# Patient Record
Sex: FEMALE | Race: WHITE | NOT HISPANIC OR LATINO | Employment: FULL TIME | URBAN - NONMETROPOLITAN AREA
[De-identification: names, ages, dates, MRNs, and addresses within clinical notes are randomized per-mention and may not be internally consistent; named-entity substitution may affect disease eponyms.]

---

## 2018-08-14 ENCOUNTER — OFFICE VISIT (OUTPATIENT)
Dept: CARDIOLOGY | Facility: CLINIC | Age: 38
End: 2018-08-14

## 2018-08-14 VITALS
BODY MASS INDEX: 22.78 KG/M2 | SYSTOLIC BLOOD PRESSURE: 138 MMHG | WEIGHT: 113 LBS | OXYGEN SATURATION: 99 % | HEART RATE: 53 BPM | DIASTOLIC BLOOD PRESSURE: 80 MMHG | HEIGHT: 59 IN

## 2018-08-14 DIAGNOSIS — Z82.49 FAMILY HISTORY OF PREMATURE CAD: ICD-10-CM

## 2018-08-14 DIAGNOSIS — R00.1 BRADYCARDIA: ICD-10-CM

## 2018-08-14 DIAGNOSIS — E78.1 HYPERTRIGLYCERIDEMIA: ICD-10-CM

## 2018-08-14 DIAGNOSIS — R73.9 HYPERGLYCEMIA: ICD-10-CM

## 2018-08-14 DIAGNOSIS — R07.1 CHEST PAIN ON BREATHING: ICD-10-CM

## 2018-08-14 DIAGNOSIS — Z78.9 STATIN INTOLERANCE: ICD-10-CM

## 2018-08-14 DIAGNOSIS — E78.01 FAMILIAL HYPERCHOLESTEROLEMIA: Primary | ICD-10-CM

## 2018-08-14 DIAGNOSIS — I10 ESSENTIAL HYPERTENSION: ICD-10-CM

## 2018-08-14 PROBLEM — E78.5 HYPERLIPIDEMIA: Status: ACTIVE | Noted: 2018-08-14

## 2018-08-14 PROCEDURE — 93000 ELECTROCARDIOGRAM COMPLETE: CPT | Performed by: NURSE PRACTITIONER

## 2018-08-14 PROCEDURE — 99244 OFF/OP CNSLTJ NEW/EST MOD 40: CPT | Performed by: NURSE PRACTITIONER

## 2018-08-14 RX ORDER — ICOSAPENT ETHYL 1000 MG/1
2 CAPSULE ORAL 2 TIMES DAILY WITH MEALS
Qty: 120 CAPSULE | Refills: 11 | Status: SHIPPED | OUTPATIENT
Start: 2018-08-14 | End: 2018-11-19

## 2018-08-14 RX ORDER — ASPIRIN 81 MG/1
81 TABLET ORAL DAILY
COMMUNITY
Start: 2018-08-14 | End: 2023-03-09

## 2018-08-14 RX ORDER — OMEPRAZOLE 20 MG/1
20 CAPSULE, DELAYED RELEASE ORAL 2 TIMES DAILY
COMMUNITY

## 2018-08-14 NOTE — ASSESSMENT & PLAN NOTE
Usually controlled with medications. Discussed increased cardiovascular risks with elevated glucose, HTN, and lipids and importance of good control along with healthy lifestyle.

## 2018-08-14 NOTE — ASSESSMENT & PLAN NOTE
Will change Lovaza to Vascepa 2 g twice a day since preferred on her formulary. CMP and lipids 6 weeks after starting Zetia and Vascepa. May need PCSK9i or Juxtapid. 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 Encouraged continued healthy diet and encouraged routine aerobic exercise. Provided written information to reinforce counseling. Consider referral to nutritionist/dietician. Check TSH.

## 2018-08-14 NOTE — ASSESSMENT & PLAN NOTE
Most likely DM2 with fasting glucose 143 and A1C 6.5% or at least prediabetes. She reports glucose levels have improved with diet. May need to consider metformin. Will defer to PCP who is seeing her tomorrow.

## 2018-08-14 NOTE — ASSESSMENT & PLAN NOTE
Will consider stress testing if has exertional, nonpleuritic chest pain. Add low dose aspirin but cautioned about possibility of worsening peptic ulcer disease.

## 2018-08-14 NOTE — PATIENT INSTRUCTIONS
Exercising to Stay Healthy  Exercising regularly is important. It has many health benefits, such as:  · Improving your overall fitness, flexibility, and endurance.  · Increasing your bone density.  · Helping with weight control.  · Decreasing your body fat.  · Increasing your muscle strength.  · Reducing stress and tension.  · Improving your overall health.    In order to become healthy and stay healthy, it is recommended that you do moderate-intensity and vigorous-intensity exercise. You can tell that you are exercising at a moderate intensity if you have a higher heart rate and faster breathing, but you are still able to hold a conversation. You can tell that you are exercising at a vigorous intensity if you are breathing much harder and faster and cannot hold a conversation while exercising.  How often should I exercise?  Choose an activity that you enjoy and set realistic goals. Your health care provider can help you to make an activity plan that works for you. Exercise regularly as directed by your health care provider. This may include:  · Doing resistance training twice each week, such as:  ? Push-ups.  ? Sit-ups.  ? Lifting weights.  ? Using resistance bands.  · Doing a given intensity of exercise for a given amount of time. Choose from these options:  ? 150 minutes of moderate-intensity exercise every week.  ? 75 minutes of vigorous-intensity exercise every week.  ? A mix of moderate-intensity and vigorous-intensity exercise every week.    Children, pregnant women, people who are out of shape, people who are overweight, and older adults may need to consult a health care provider for individual recommendations. If you have any sort of medical condition, be sure to consult your health care provider before starting a new exercise program.  What are some exercise ideas?  Some moderate-intensity exercise ideas include:  · Walking at a rate of 1 mile in 15  minutes.  · Biking.  · Hiking.  · Golfing.  · Dancing.    Some vigorous-intensity exercise ideas include:  · Walking at a rate of at least 4.5 miles per hour.  · Jogging or running at a rate of 5 miles per hour.  · Biking at a rate of at least 10 miles per hour.  · Lap swimming.  · Roller-skating or in-line skating.  · Cross-country skiing.  · Vigorous competitive sports, such as football, basketball, and soccer.  · Jumping rope.  · Aerobic dancing.    What are some everyday activities that can help me to get exercise?  · Yard work, such as:  ? Pushing a .  ? Raking and bagging leaves.  · Washing and waxing your car.  · Pushing a stroller.  · Shoveling snow.  · Gardening.  · Washing windows or floors.  How can I be more active in my day-to-day activities?  · Use the stairs instead of the elevator.  · Take a walk during your lunch break.  · If you drive, park your car farther away from work or school.  · If you take public transportation, get off one stop early and walk the rest of the way.  · Make all of your phone calls while standing up and walking around.  · Get up, stretch, and walk around every 30 minutes throughout the day.  What guidelines should I follow while exercising?  · Do not exercise so much that you hurt yourself, feel dizzy, or get very short of breath.  · Consult your health care provider before starting a new exercise program.  · Wear comfortable clothes and shoes with good support.  · Drink plenty of water while you exercise to prevent dehydration or heat stroke. Body water is lost during exercise and must be replaced.  · Work out until you breathe faster and your heart beats faster.  This information is not intended to replace advice given to you by your health care provider. Make sure you discuss any questions you have with your health care provider.  Document Released: 01/20/2012 Document Revised: 05/25/2017 Document Reviewed: 05/21/2015  [employer] Interactive Patient Education © 2018  "[employer]  Food Choices to Lower Your Triglycerides  Triglycerides are a type of fat in your blood. High levels of triglycerides can increase the risk of heart disease and stroke. If your triglyceride levels are high, the foods you eat and your eating habits are very important. Choosing the right foods can help lower your triglycerides.  What general guidelines do I need to follow?  · Lose weight if you are overweight.  · Limit or avoid alcohol.  · Fill one half of your plate with vegetables and green salads.  · Limit fruit to two servings a day. Choose fruit instead of juice.  · Make one fourth of your plate whole grains. Look for the word \"whole\" as the first word in the ingredient list.  · Fill one fourth of your plate with lean protein foods.  · Enjoy fatty fish (such as salmon, mackerel, sardines, and tuna) three times a week.  · Choose healthy fats.  · Limit foods high in starch and sugar.  · Eat more home-cooked food and less restaurant, buffet, and fast food.  · Limit fried foods.  · Cook foods using methods other than frying.  · Limit saturated fats.  · Check ingredient lists to avoid foods with partially hydrogenated oils (trans fats) in them.  What foods can I eat?  Grains  Whole grains, such as whole wheat or whole grain breads, crackers, cereals, and pasta. Unsweetened oatmeal, bulgur, barley, quinoa, or brown rice. Corn or whole wheat flour tortillas.  Vegetables  Fresh or frozen vegetables (raw, steamed, roasted, or grilled). Green salads.  Fruits  All fresh, canned (in natural juice), or frozen fruits.  Meat and Other Protein Products  Ground beef (85% or leaner), grass-fed beef, or beef trimmed of fat. Skinless chicken or turkey. Ground chicken or turkey. Pork trimmed of fat. All fish and seafood. Eggs. Dried beans, peas, or lentils. Unsalted nuts or seeds. Unsalted canned or dry beans.  Dairy  Low-fat dairy products, such as skim or 1% milk, 2% or reduced-fat cheeses, low-fat ricotta or cottage " cheese, or plain low-fat yogurt.  Fats and Oils  Tub margarines without trans fats. Light or reduced-fat mayonnaise and salad dressings. Avocado. Safflower, olive, or canola oils. Natural peanut or almond butter.  The items listed above may not be a complete list of recommended foods or beverages. Contact your dietitian for more options.  What foods are not recommended?  Grains  White bread. White pasta. White rice. Cornbread. Bagels, pastries, and croissants. Crackers that contain trans fat.  Vegetables  White potatoes. Corn. Creamed or fried vegetables. Vegetables in a cheese sauce.  Fruits  Dried fruits. Canned fruit in light or heavy syrup. Fruit juice.  Meat and Other Protein Products  Fatty cuts of meat. Ribs, chicken wings, hyman, sausage, bologna, salami, chitterlings, fatback, hot dogs, bratwurst, and packaged luncheon meats.  Dairy  Whole or 2% milk, cream, half-and-half, and cream cheese. Whole-fat or sweetened yogurt. Full-fat cheeses. Nondairy creamers and whipped toppings. Processed cheese, cheese spreads, or cheese curds.  Sweets and Desserts  Corn syrup, sugars, honey, and molasses. Candy. Jam and jelly. Syrup. Sweetened cereals. Cookies, pies, cakes, donuts, muffins, and ice cream.  Fats and Oils  Butter, stick margarine, lard, shortening, ghee, or hyman fat. Coconut, palm kernel, or palm oils.  Beverages  Alcohol. Sweetened drinks (such as sodas, lemonade, and fruit drinks or punches).  The items listed above may not be a complete list of foods and beverages to avoid. Contact your dietitian for more information.  This information is not intended to replace advice given to you by your health care provider. Make sure you discuss any questions you have with your health care provider.  Document Released: 10/05/2005 Document Revised: 05/25/2017 Document Reviewed: 10/22/2014  [employer] Interactive Patient Education © 2017 [employer]

## 2018-08-14 NOTE — PROGRESS NOTES
Subjective:     Encounter Date:08/14/2018    Chief Complaint:    Patient ID: Marguerite Bush is a 37 y.o. female here today for cardiac evaluation for chest pain and mixed hyperlipidemia with severe hyperlipidemia by AMIRA Wright. She was started on Zetia 2 weeks ago but has been unable to get Lovaza approved by insurance. She did not notify Kandi of not being able to get the medication.    HPI     Hyperlipidemia    Additional comments: Feels better since starting eating better and Zetia. Very active but not exercising regularly.            Chest Pain    Additional comments: She attributes to pleurisy from asthma.  Sent to [facility] emergency room due to abnormal EKG with negative workup.       Last edited by Jacklyn Wagner APRN on 8/14/2018 12:53 PM. (History)       Hyperlipidemia   This is a new problem. The current episode started more than 1 year ago. The problem is uncontrolled. Recent lipid tests were reviewed and are variable. Factors aggravating her hyperlipidemia include fatty foods. Associated symptoms include chest pain (pleurisy from asthma) and shortness of breath. Current antihyperlipidemic treatment includes ezetimibe and diet change. Compliance problems include medication side effects, adherence to diet and adherence to exercise.  Risk factors for coronary artery disease include hypertension, stress, dyslipidemia and diabetes mellitus (fasting glucose was 143 on 7/31/18 - awaiting A1C).   Chest Pain    This is a new problem. The current episode started more than 1 year ago. The onset quality is undetermined. The problem occurs intermittently. The problem has been unchanged. The pain is present in the lateral region (R). The pain is severe. The quality of the pain is described as sharp. The pain radiates to the right shoulder. Associated symptoms include abdominal pain, back pain, a cough, dizziness, headaches, nausea, near-syncope and shortness of breath. Pertinent negatives  include no fever, irregular heartbeat, numbness, palpitations, syncope or vomiting. The cough's precipitants include allergen exposure. The cough is relieved by a beta-agonist. The cough is worsened by allergens. The pain is aggravated by coughing and deep breathing. She has tried nothing for the symptoms.   Her past medical history is significant for hyperlipidemia.     History:   Past Medical History:   Diagnosis Date   • Anxiety    • Asthma    • Depression    • Hyperlipidemia    • Hypertension    • Peptic ulcer    • Statin intolerance      Past Surgical History:   Procedure Laterality Date   • HYSTERECTOMY  2011     Social History     Social History   • Marital status:      Spouse name: N/A   • Number of children: N/A   • Years of education: N/A     Occupational History   • Not on file.     Social History Main Topics   • Smoking status: Never Smoker   • Smokeless tobacco: Never Used   • Alcohol use No   • Drug use: No   • Sexual activity: Defer     Other Topics Concern   • Not on file     Social History Narrative   • No narrative on file     Family History   Problem Relation Age of Onset   • Hyperlipidemia Mother    • Hypertension Mother    • Hyperlipidemia Father    • Metabolic syndrome Father    • Hypertension Father    • Diabetes type I Maternal Aunt    • Diabetes type II Maternal Uncle    • Heart attack Maternal Grandmother 50   • Heart disease Maternal Grandmother    • Hyperlipidemia Maternal Grandmother    • Hypertension Maternal Grandmother    • Heart failure Maternal Grandmother    • Heart disease Maternal Grandfather    • Prostate cancer Maternal Grandfather    • Hypertension Paternal Grandmother    • Heart attack Paternal Grandmother    • Liver cancer Paternal Grandmother    • Dementia Paternal Grandfather        Outpatient Prescriptions Marked as Taking for the 8/14/18 encounter (Office Visit) with Jacklyn Wagner APRN   Medication Sig Dispense Refill   • albuterol (PROAIR HFA) 108 (90  "Base) MCG/ACT inhaler Inhale 2 puffs Daily.     • citalopram (CeleXA) 10 MG tablet Take 10 mg by mouth Daily.     • ezetimibe (ZETIA) 10 MG tablet Take 10 mg by mouth Daily.     • linaclotide (LINZESS) 72 MCG capsule capsule Take 72 mcg by mouth Every Morning Before Breakfast.     • lisinopril (PRINIVIL,ZESTRIL) 2.5 MG tablet Take 2.5 mg by mouth 2 (Two) Times a Day.     • omeprazole (priLOSEC) 20 MG capsule Take 20 mg by mouth 2 (Two) Times a Day.     • sucralfate (CARAFATE) 1 g tablet Take 1 g by mouth 2 (Two) Times a Day.         Review of Systems:  Review of Systems   Constitution: Positive for decreased appetite and weight loss (5 lbs in 2 weeks with diet change). Negative for fever.   HENT: Positive for congestion. Negative for nosebleeds.    Eyes: Negative for blurred vision and double vision.   Cardiovascular: Positive for chest pain (pleurisy from asthma), leg swelling and near-syncope. Negative for dyspnea on exertion, irregular heartbeat, palpitations and syncope.   Respiratory: Positive for cough and shortness of breath.    Endocrine: Negative for cold intolerance and heat intolerance.   Hematologic/Lymphatic: Does not bruise/bleed easily.   Skin: Positive for dry skin and itching.   Musculoskeletal: Positive for arthritis, back pain, neck pain and stiffness. Negative for falls, joint pain and muscle cramps.   Gastrointestinal: Positive for abdominal pain, constipation, heartburn and nausea. Negative for diarrhea, melena and vomiting.   Genitourinary: Negative for hematuria.   Neurological: Positive for dizziness, headaches, light-headedness and loss of balance. Negative for numbness.   Psychiatric/Behavioral: Positive for depression. The patient has insomnia and is nervous/anxious.             Objective:   /80 (BP Location: Left arm, Patient Position: Sitting, Cuff Size: Adult)   Pulse 53   Ht 149.9 cm (59\")   Wt 51.3 kg (113 lb)   SpO2 99%   BMI 22.82 kg/m²   Wt Readings from Last 3 " Encounters:   08/14/18 51.3 kg (113 lb)         Physical Exam   Constitutional: She is oriented to person, place, and time. She appears well-developed and well-nourished.   HENT:   Head: Normocephalic and atraumatic.   Right Ear: External ear normal.   Left Ear: External ear normal.   Nose: Nose normal.   Mouth/Throat: Oropharynx is clear and moist.   Eyes: Pupils are equal, round, and reactive to light. Conjunctivae and EOM are normal. Right eye exhibits no discharge. Left eye exhibits no discharge. No scleral icterus.   Neck: Normal range of motion. Neck supple. No JVD present. No tracheal deviation present. No thyromegaly present.   Cardiovascular: Normal rate and regular rhythm.  Exam reveals no gallop and no friction rub.    No murmur heard.  Pulmonary/Chest: Effort normal and breath sounds normal. She has no wheezes. She has no rales. She exhibits tenderness (R anterior).   Abdominal: Soft. Bowel sounds are normal. She exhibits no mass. There is tenderness in the epigastric area. There is no rebound and no guarding.   Musculoskeletal: She exhibits no edema, tenderness or deformity.   Lymphadenopathy:     She has no cervical adenopathy.   Neurological: She is alert and oriented to person, place, and time. No cranial nerve deficit.   Skin: Skin is warm and dry.   Psychiatric: She has a normal mood and affect. Her behavior is normal. Judgment and thought content normal.   Vitals reviewed.      Lab/Diagnostics Review:   07/31/2018  CBC WBC 6500, hemoglobin 12.6, hematocrit 39.3%, platelets 290,000.  CMP sodium 136, potassium 4.1, chloride 100, CO2 28.8, BUN 10, creatinine 0.6, calcium 9.0, glucose 143 (fasting), alkaline phosphatase 64, AST 17 ALT 32, total protein 7.8, albumin 4.0, total bilirubin 0.2  Lipid panel total cholesterol 280, triglycerides greater than 1000, HDL 25, LDL incalculable  Hemoglobin A1c 6.5%      ECG 12 Lead  Date/Time: 8/14/2018 1:05 PM  Performed by: PARIS CAN  Authorized by:  PARIS CAN   Comparison: not compared with previous ECG   Previous ECG: no previous ECG available  Rhythm: sinus bradycardia  Rate: bradycardic  BPM: 49  QRS axis: normal  Clinical impression: abnormal ECG                Assessment/Plan:         Problem List Items Addressed This Visit        Cardiovascular and Mediastinum    Hyperlipidemia - Primary    Current Assessment & Plan     Will change Lovaza to Vascepa 2 g twice a day since preferred on her formulary. CMP and lipids 6 weeks after starting Zetia and Vascepa. May need PCSK9i or Juxtapid. 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 Encouraged continued healthy diet and encouraged routine aerobic exercise. Provided written information to reinforce counseling. Consider referral to nutritionist/dietician. Check TSH.          Relevant Medications    icosapent ethyl (VASCEPA) 1 g capsule capsule    aspirin 81 MG EC tablet    Other Relevant Orders    TSH    Hypertension    Current Assessment & Plan     Usually controlled with medications. Discussed increased cardiovascular risks with elevated glucose, HTN, and lipids and importance of good control along with healthy lifestyle.          Relevant Medications    aspirin 81 MG EC tablet    Other Relevant Orders    ECG 12 Lead    Bradycardia    Current Assessment & Plan     Low grade, asymptomatic. Check TSH.          Relevant Orders    TSH    Hypertriglyceridemia    Relevant Medications    icosapent ethyl (VASCEPA) 1 g capsule capsule    aspirin 81 MG EC tablet       Nervous and Auditory    Chest pain on breathing    Relevant Medications    aspirin 81 MG EC tablet    Other Relevant Orders    ECG 12 Lead       Other    Statin intolerance    Current Assessment & Plan     Severe myalgias with atorvastatin and rosuvastatin in past even with coQ10         Family history of premature CAD    Current Assessment & Plan     Will consider stress testing if has exertional, nonpleuritic chest pain. Add low dose aspirin but  cautioned about possibility of worsening peptic ulcer disease.         Relevant Medications    aspirin 81 MG EC tablet    Hyperglycemia    Current Assessment & Plan     Most likely DM2 with fasting glucose 143 and A1C 6.5% or at least prediabetes. She reports glucose levels have improved with diet. May need to consider metformin. Will defer to PCP who is seeing her tomorrow.         Relevant Medications    aspirin 81 MG EC tablet          Return in about 3 months (around 11/14/2018) for Recheck.           AMIRA Medina, ACNP-BC, CHFN-BC

## 2018-08-15 NOTE — PROGRESS NOTES
CALLED [facility] REQUESTED LAST EKG FROM 07/31/2018   ALSO STATED PA OV VASCEPA THROUGH [facility]

## 2018-08-16 ENCOUNTER — RESULTS ENCOUNTER (OUTPATIENT)
Dept: CARDIOLOGY | Facility: CLINIC | Age: 38
End: 2018-08-16

## 2018-08-16 DIAGNOSIS — R00.1 BRADYCARDIA: ICD-10-CM

## 2018-08-16 DIAGNOSIS — E78.01 FAMILIAL HYPERCHOLESTEROLEMIA: ICD-10-CM

## 2018-11-19 ENCOUNTER — OFFICE VISIT (OUTPATIENT)
Dept: CARDIOLOGY | Facility: CLINIC | Age: 38
End: 2018-11-19

## 2018-11-19 VITALS
HEIGHT: 59 IN | DIASTOLIC BLOOD PRESSURE: 78 MMHG | OXYGEN SATURATION: 98 % | WEIGHT: 105.6 LBS | BODY MASS INDEX: 21.29 KG/M2 | HEART RATE: 62 BPM | SYSTOLIC BLOOD PRESSURE: 118 MMHG

## 2018-11-19 DIAGNOSIS — Z82.49 FAMILY HISTORY OF PREMATURE CAD: Chronic | ICD-10-CM

## 2018-11-19 DIAGNOSIS — I10 ESSENTIAL HYPERTENSION: Chronic | ICD-10-CM

## 2018-11-19 DIAGNOSIS — Z78.9 STATIN INTOLERANCE: Chronic | ICD-10-CM

## 2018-11-19 DIAGNOSIS — E11.9 CONTROLLED TYPE 2 DIABETES MELLITUS WITHOUT COMPLICATION, WITHOUT LONG-TERM CURRENT USE OF INSULIN (HCC): Chronic | ICD-10-CM

## 2018-11-19 DIAGNOSIS — E78.1 HYPERTRIGLYCERIDEMIA: Primary | Chronic | ICD-10-CM

## 2018-11-19 PROCEDURE — 99214 OFFICE O/P EST MOD 30 MIN: CPT | Performed by: NURSE PRACTITIONER

## 2018-11-19 RX ORDER — TIZANIDINE 4 MG/1
4 TABLET ORAL NIGHTLY
Status: ON HOLD | COMMUNITY
End: 2019-10-23

## 2018-11-19 RX ORDER — GABAPENTIN 300 MG/1
300 CAPSULE ORAL DAILY
Status: ON HOLD | COMMUNITY
End: 2019-10-16

## 2018-11-19 NOTE — ASSESSMENT & PLAN NOTE
Reportedly much improved with Zetia, diet, and exercise. Continue present therapy. No need for Vascepa at this time.

## 2018-11-19 NOTE — PROGRESS NOTES
Subjective:     Encounter Date:11/19/2018    Chief Complaint:    Patient ID: Marguerite Bush is a 38 y.o. female here today for 3 month cardiac follow-up.    HPI     Familial Hypercholesterolemia      Additional comments: has been diagnosed with diabetes, was able to get A1C down to 5.9% with metformin, diet, and exercise. [employer] refused to pay for Vascepa. Her copay would be $310/month. Hasn't tried applying for patient assistance. Triglycerides were > 1000 and down to 160 with Zetia, diet, and exercise.               Hypertension      Additional comments: controlled off BP meds after 18 lb weight loss          Last edited by Jacklyn Wagner APRN on 11/19/2018  4:12 PM. (History)        History:   Past Medical History:   Diagnosis Date   • Anxiety    • Asthma    • Depression    • Hyperlipidemia    • Hypertension    • Peptic ulcer    • Statin intolerance      Past Surgical History:   Procedure Laterality Date   • HYSTERECTOMY  2011     Social History     Socioeconomic History   • Marital status:      Spouse name: Not on file   • Number of children: Not on file   • Years of education: Not on file   • Highest education level: Not on file   Social Needs   • Financial resource strain: Not on file   • Food insecurity - worry: Not on file   • Food insecurity - inability: Not on file   • Transportation needs - medical: Not on file   • Transportation needs - non-medical: Not on file   Occupational History   • Not on file   Tobacco Use   • Smoking status: Never Smoker   • Smokeless tobacco: Never Used   Substance and Sexual Activity   • Alcohol use: No   • Drug use: No   • Sexual activity: Defer   Other Topics Concern   • Not on file   Social History Narrative   • Not on file     Family History   Problem Relation Age of Onset   • Hyperlipidemia Mother    • Hypertension Mother    • Hyperlipidemia Father    • Metabolic syndrome Father    • Hypertension Father    • Diabetes type I Maternal Aunt    • Diabetes  type II Maternal Uncle    • Heart attack Maternal Grandmother 50   • Heart disease Maternal Grandmother    • Hyperlipidemia Maternal Grandmother    • Hypertension Maternal Grandmother    • Heart failure Maternal Grandmother    • Heart disease Maternal Grandfather    • Prostate cancer Maternal Grandfather    • Hypertension Paternal Grandmother    • Heart attack Paternal Grandmother    • Liver cancer Paternal Grandmother    • Dementia Paternal Grandfather        Outpatient Medications Marked as Taking for the 11/19/18 encounter (Office Visit) with Jacklyn Wagner APRN   Medication Sig Dispense Refill   • albuterol (PROAIR HFA) 108 (90 Base) MCG/ACT inhaler Inhale 2 puffs Daily.     • aspirin 81 MG EC tablet Take 1 tablet by mouth Daily.     • citalopram (CeleXA) 10 MG tablet Take 20 mg by mouth Daily.     • ezetimibe (ZETIA) 10 MG tablet Take 10 mg by mouth Daily.     • gabapentin (NEURONTIN) 300 MG capsule Take 300 mg by mouth Daily.     • linaclotide (LINZESS) 72 MCG capsule capsule Take 72 mcg by mouth Every Morning Before Breakfast.     • omeprazole (priLOSEC) 20 MG capsule Take 20 mg by mouth 2 (Two) Times a Day.     • tiZANidine (ZANAFLEX) 4 MG tablet Take 4 mg by mouth Every Night.         Review of Systems:  Review of Systems   Constitution: Positive for weight loss (intentional). Negative for chills, decreased appetite, fever, weakness and malaise/fatigue.   HENT: Positive for congestion. Negative for nosebleeds.    Eyes: Negative for blurred vision and double vision.   Cardiovascular: Positive for leg swelling (ankles ). Negative for chest pain, dyspnea on exertion, irregular heartbeat, near-syncope, palpitations and syncope.   Respiratory: Positive for cough, shortness of breath and wheezing.    Endocrine: Positive for cold intolerance. Negative for heat intolerance.   Hematologic/Lymphatic: Bruises/bleeds easily.   Skin: Positive for dry skin and itching.   Musculoskeletal: Positive for arthritis,  "back pain, muscle cramps (shoulder), neck pain and stiffness. Negative for falls, joint pain and joint swelling.   Gastrointestinal: Positive for abdominal pain (cramping from ibs ), constipation and nausea. Negative for diarrhea, heartburn, melena and vomiting.   Genitourinary: Positive for nocturia (2 times a night ). Negative for hematuria.   Neurological: Positive for excessive daytime sleepiness, dizziness, headaches, light-headedness and loss of balance. Negative for numbness.   Psychiatric/Behavioral: Positive for depression. The patient has insomnia and is nervous/anxious.             Objective:   /78 (BP Location: Left arm, Patient Position: Sitting, Cuff Size: Adult)   Pulse 62   Ht 149.9 cm (59\")   Wt 47.9 kg (105 lb 9.6 oz)   SpO2 98%   BMI 21.33 kg/m²   Wt Readings from Last 3 Encounters:   11/19/18 47.9 kg (105 lb 9.6 oz)   08/14/18 51.3 kg (113 lb)         Physical Exam   Constitutional: She is oriented to person, place, and time. She appears well-developed and well-nourished.   Eyes: No scleral icterus.   Neck: No JVD present.   Cardiovascular: Normal rate, regular rhythm, normal heart sounds and intact distal pulses. Exam reveals no gallop and no friction rub.   No murmur heard.  Pulmonary/Chest: Effort normal and breath sounds normal.   Musculoskeletal: She exhibits no edema.   Neurological: She is alert and oriented to person, place, and time.   Skin: Skin is warm and dry.   Psychiatric: She has a normal mood and affect.   Vitals reviewed.      Lab/Diagnostics Review:   07/31/2018  CBC WBC 6500, hemoglobin 12.6, hematocrit 39.3%, platelets 290,000.  CMP sodium 136, potassium 4.1, chloride 100, CO2 28.8, BUN 10, creatinine 0.6, calcium 9.0, glucose 143 (fasting), alkaline phosphatase 64, AST 17 ALT 32, total protein 7.8, albumin 4.0, total bilirubin 0.2  Lipid panel total cholesterol 280, triglycerides greater than 1000, HDL 25, LDL incalculable  Hemoglobin A1c 6.5%    Procedures: none " in office today        Assessment/Plan:         Problem List Items Addressed This Visit        Cardiovascular and Mediastinum    Hypertension (Chronic)    Current Assessment & Plan     Well controlled of medications. Continue present therapy. Continue healthy diet and routine aerobic exercise.          Hypertriglyceridemia - Primary (Chronic)    Current Assessment & Plan     Reportedly much improved. Continue present therapy.              Endocrine    Controlled type 2 diabetes mellitus without complication, without long-term current use of insulin (CMS/[facility]) (Chronic)    Current Assessment & Plan     Reportedly much improved. Kandi Dueñas, NP following.             Other    Statin intolerance (Chronic)    Family history of premature CAD (Chronic)        Return if symptoms worsen or fail to improve.           Jacklyn Wagner, APRN, ACNP-BC, CHFN-BC

## 2018-11-19 NOTE — ASSESSMENT & PLAN NOTE
Well controlled of medications. Continue present therapy. Continue healthy diet and routine aerobic exercise.

## 2019-07-31 ENCOUNTER — TELEPHONE (OUTPATIENT)
Dept: OTOLARYNGOLOGY | Facility: CLINIC | Age: 39
End: 2019-07-31

## 2019-08-14 ENCOUNTER — OFFICE VISIT (OUTPATIENT)
Dept: OTOLARYNGOLOGY | Facility: CLINIC | Age: 39
End: 2019-08-14

## 2019-08-14 VITALS
SYSTOLIC BLOOD PRESSURE: 138 MMHG | BODY MASS INDEX: 19.76 KG/M2 | HEART RATE: 57 BPM | RESPIRATION RATE: 18 BRPM | DIASTOLIC BLOOD PRESSURE: 72 MMHG | HEIGHT: 59 IN | TEMPERATURE: 98.4 F | WEIGHT: 98 LBS | OXYGEN SATURATION: 99 %

## 2019-08-14 DIAGNOSIS — H93.13 TINNITUS, BILATERAL: ICD-10-CM

## 2019-08-14 DIAGNOSIS — M26.621 ARTHRALGIA OF RIGHT TEMPOROMANDIBULAR JOINT: ICD-10-CM

## 2019-08-14 DIAGNOSIS — H81.01 MENIERE DISEASE, RIGHT: ICD-10-CM

## 2019-08-14 DIAGNOSIS — M79.18 MYOFASCIAL PAIN ON RIGHT SIDE: Primary | ICD-10-CM

## 2019-08-14 DIAGNOSIS — J32.0 SINUSITIS, MAXILLARY, CHRONIC: ICD-10-CM

## 2019-08-14 DIAGNOSIS — H73.891 TENSOR TYMPANI SPASM DISORDER, RIGHT: ICD-10-CM

## 2019-08-14 DIAGNOSIS — R42 VERTIGO: ICD-10-CM

## 2019-08-14 PROCEDURE — 99204 OFFICE O/P NEW MOD 45 MIN: CPT | Performed by: OTOLARYNGOLOGY

## 2019-08-14 RX ORDER — CARISOPRODOL 250 MG/1
1 TABLET ORAL 2 TIMES DAILY
Qty: 60 TABLET | Refills: 1 | Status: SHIPPED | OUTPATIENT
Start: 2019-08-14 | End: 2023-03-09

## 2019-08-14 RX ORDER — GABAPENTIN 300 MG/1
300 CAPSULE ORAL 2 TIMES DAILY
Qty: 60 CAPSULE | Refills: 1 | Status: SHIPPED | OUTPATIENT
Start: 2019-08-14 | End: 2023-03-09

## 2019-08-14 NOTE — PROGRESS NOTES
Homer Millan Jr, MD     Chief Complaint   Patient presents with   • Sinusitis        HISTORY OF PRESENT ILLNESS:   Accompanied by:     Marguerite Bush is a  38 y.o. female with CT scan.  She ahs been told TNJ, enlarged tonsils, and sinus cysts.  She has been sent here for all of this.  She has chronic tonsillitis, sinusitis.  She has runny nose, watery eyes, chronic fever. She has ear pain. She has TMJ.  Valium- for TMJ.  No antibiotics.  She has had a Z evy for ear infection.  ENT- last evaluation. Dr Perry for dizziness.  She has had all the above symptoms since seeing Dr Perry.  She bit on sandwich and jaw popped. Hurts since then. She can barely open mouth. No dental evaluation.  Dr Perry told patient atypical Meniere's. Low Freq hearing loss.   Dizziness- all the time. She states room spinning. She has had feeling 6 drinks.  Rx- Diazepam, Dyazide. Was helping. Out of meds.  She is on Gabapentin for bulging disc. She has to use Aleve and gabapentin and aleve to get mouth open.  She has been on Zanaflex- helps some  Tinnitus- echoing in [place], buzzing for over 10 yrs.    Review of Systems  Reviewed per patient intake note and confirmed with patient    Past History:  Past Medical History:   Diagnosis Date   • Anxiety    • Asthma    • Controlled type 2 diabetes mellitus without complication, without long-term current use of insulin (CMS/[facility])    • Depression    • Hyperlipidemia    • Hypertension    • Peptic ulcer    • Statin intolerance      Past Surgical History:   Procedure Laterality Date   • HYSTERECTOMY  2011     Family History   Problem Relation Age of Onset   • Hyperlipidemia Mother    • Hypertension Mother    • Hyperlipidemia Father    • Metabolic syndrome Father    • Hypertension Father    • Diabetes type I Maternal Aunt    • Diabetes type II Maternal Uncle    • Heart attack Maternal Grandmother 50   • Heart disease Maternal Grandmother    • Hyperlipidemia Maternal Grandmother    • Hypertension  Maternal Grandmother    • Heart failure Maternal Grandmother    • Heart disease Maternal Grandfather    • Prostate cancer Maternal Grandfather    • Hypertension Paternal Grandmother    • Heart attack Paternal Grandmother    • Liver cancer Paternal Grandmother    • Dementia Paternal Grandfather      Social History     Tobacco Use   • Smoking status: Never Smoker   • Smokeless tobacco: Never Used   Substance Use Topics   • Alcohol use: No   • Drug use: No     Outpatient Medications Marked as Taking for the 8/14/19 encounter (Office Visit) with Homer Millan Jr., MD   Medication Sig Dispense Refill   • albuterol (PROAIR HFA) 108 (90 Base) MCG/ACT inhaler Inhale 2 puffs Daily.     • aspirin 81 MG EC tablet Take 1 tablet by mouth Daily.     • citalopram (CeleXA) 10 MG tablet Take 20 mg by mouth Daily.     • ezetimibe (ZETIA) 10 MG tablet Take 10 mg by mouth Daily.     • gabapentin (NEURONTIN) 300 MG capsule Take 300 mg by mouth Daily.     • omeprazole (priLOSEC) 20 MG capsule Take 20 mg by mouth 2 (Two) Times a Day.     • tiZANidine (ZANAFLEX) 4 MG tablet Take 4 mg by mouth Every Night.       Allergies:  Allegra [fexofenadine]; Doxycycline; Amoxicillin; Lortab [hydrocodone-acetaminophen]; and Penicillins        Vital Signs:   Temp:  [98.4 °F (36.9 °C)] 98.4 °F (36.9 °C)  Heart Rate:  [57] 57  Resp:  [18] 18  BP: (138)/(72) 138/72    EXAMINATION:   CONSTITIONAL: well nourished, well-developed, alert, oriented, in no acute distress , small    BODY HABITUS: Normal body habitus     COMMUNICATION: able to communicate normally, normal voice quality    HEAD: Normocephalic, without obvious abnormality, atraumatic    FACE: structure normal, no tenderness present, no lesions/masses, no evidence of trauma    SALIVARY GLANDS: parotid glands with no tenderness, no swelling, no masses, submandibular glands with normal size, nontender     EYE: ocular motility normal, eyelids normal, orbits normal, no proptosis, conjunctiva  clear, sclera non-icteric, pupils equal, round, reactive to light and accomodation    HEARING: response to conversational voice normal    EARS: Otoscopic exam   Bilateral  normal pinna with no lesions, Canals normal size and shape, Tympanic membranes normal, Ossicular chain intact, Middle ear clear     NOSE EXTERNAL: APPEARANCE: normal, straight, with good projection, no tenderness, no lesions, no tenderness, good nasal support, patent nares    NOSE INTERNAL: Anterior rhinoscopy performed   NASALMUCOSA:    abnormal:        Bilateral- mildly red, mildly dry    NASAL PASSAGES:     abnormal   Bilateral- R>L narrowed    NASAL VALVE:     intact, good support and no nasal obstruction   SEPTUM:     mucosa abnormal   Bilateral- mildly red, mildly dry     deviation present:      deviated Right Mid cartilage mild to mod   INFERIOR TURBINATES:     normal size, non-obstructing, no lesions   SECRETIONS:     abnormal Bilateral- dry     ORAL CAVITY: Normal lips with no lesions, dentition normal for age, FOM intact without lesions and normal salivary flow, Mucosa intact without lesions, Hard and soft palate normal without lesions    OROPHARYNX: oropharyngeal mucosa normal, tonsil with normal appearance    NECK: normal appearance, no masses, no lesions, larynx normal mobility, trachea midline  tender into R>L ngle area   LARYNGEAL POSITION: normal   LARYNGEAL ELEVATION:  Normal  TRACHEA:   midline    LYMPH NODES : no adenopathy    THYROID: no overt thyromegaly, no tenderness, nodules or mass present on palpation, position midline    CHEST/RESPIRATORY: respiratory effort normal, no rales, rubs or wheezing, no stridor, normal appearance to chest    CARDIOVASCULAR: regular rate and rhythm, no murmurs, gallups, no peripheral edema    NEUROPSYCHIATRIC: oriented appropriately for age, mood normal, affect appropriate, cranial nerves intact grossly (unless specifically described), gait normal for age     TMJ EXAM:  Tenderness:     Right-  severe, TMJ and MM groove  Opening:     Deviation none  Clicking/Popping:     Right- mild  Pain radiation:     Right- into MM groove and R upper neck  Occlusion:     Class I  Bilateral    RESULTS REVIEW:    I have personally reviewed the patient's ct scan of the sinuses without contrast images.  I have personally reviewed the patient's ct scan of the sinuses without contrast report.     Assessment      Problem List Items Addressed This Visit     None      Visit Diagnoses     Myofascial pain on right side    -  Primary    Mastication    Meniere disease, right        by history, records not available    Relevant Orders    Comprehensive Hearing Test    Arthralgia of right temporomandibular joint        moderate to severe, responds to current meds    Relevant Medications    carisoprodol (SOMA) 250 MG tablet    gabapentin (NEURONTIN) 300 MG capsule    Sinusitis, maxillary, chronic        R side with cysts, not obstructing the ostium, CT evaluated    Vertigo        by hx, Meniere's dx, treated with vest suppressants    Tensor tympani spasm disorder, right        Causing tinnitus and hearing changes    Tinnitus, bilateral        Tensor spasm, Meniere's     Relevant Orders    Comprehensive Hearing Test          Plan       Conservative management.   Patient has numerous symptoms to evaluate. I will try to get records and sort through the symptoms to see if I can control the complaints.  I have discussed this with patient to begin control.  I will continue Gabapentin, add muscle relaxants and try to avoid Benzodiapines right now. She does respond to muscle relaxants by trial. I do not wish to stop everything at once.to avoid symptom exacerbation. TMJ may be causing vertigo, tinnitus and Pain.  Sinus findings will be dealt with later.  New Medications Ordered This Visit   Medications   • carisoprodol (SOMA) 250 MG tablet     Sig: Take 1 tablet by mouth 2 (Two) Times a Day.     Dispense:  60 tablet     Refill:  1   • gabapentin  (NEURONTIN) 300 MG capsule     Sig: Take 1 capsule by mouth 2 (Two) Times a Day.     Dispense:  60 capsule     Refill:  1   Patient has numerous ENT co-morbidities. TMJ, Myofascial pain R mastication, vertigo Meneire's?, Sinus cyst. I will try to control symptoms and get futher diagnostics to evaluate  Orders Placed This Encounter   Procedures   • Comprehensive Hearing Test   TMJ Recs  Tinnitus Rec  Meniuere's regimen  Soma BID  Advil TID  Low Na diet  ECoG  Dr Perry medical records  Patient, Spouse understands and agrees with the treatment plan as described.       Return after ECoG, for Recheck ears, ENT sxs, TMJ.    Homer Millan Jr, MD  08/14/19  2:11 PM

## 2019-08-14 NOTE — PROGRESS NOTES
Yessi Montenegro   Patient Intake Note    Review of Systems  Review of Systems   Gastrointestinal: Positive for nausea.   Neurological: Positive for headaches.       QUALITY MEASURES    Tobacco Use: Screening and Cessation Intervention  Social History    Tobacco Use      Smoking status: Never Smoker      Smokeless tobacco: Never Used        Yessi Montenegro  8/14/2019  1:14 PM

## 2019-08-14 NOTE — PATIENT INSTRUCTIONS
MENIERE'S REGIMEN:  Try to limit total sodium (salt intake to 2000 milligrams a day (you will need to look at packaging on foods and may have to look on the internet for sodium content of fresh    foods and vegetables  Low fat, low cholesterol diet  Avoid alcohol  Avoid Caffeine and other stimulants  Avoid recreational drugs  Vestibular exercises, inner ear exercises  Regular exercise, preferably aerobic if tolerated  Make sure you are doing all you can to optimize other medical conditions  Reduce high blood pressure, Control Diabetes, etc)        TINNITUS  Tinnitus (latin for ringing) is the sensation of noise in the ears. This symptom can be quite variable and disconcerting. Most people have had ringing in the ears but most do not require treatment. Only 6% of people have ringing troubling enough to seek treatment.    Symptoms can range from ringing to “noise” of any sort in the ear or ears. Timing can vary as well. There are no specific symptom requirements to have tinnitus. It is speculated that the inner ear hair cells (responsible for hearing) may be dying and causing the brain to seek additional input for sound that is missing. There are many theories for the etiology of tinnitus or ringing.    You may be referred for hearing testing or imaging of the ears/brain to try to determine what is causing ringing and how to best treat the condition.    Tinnitus Recommendations-  >Avoid loud or sudden noise  >Wear hearing protection in the presence of loud noise  >Avoid irritants like caffeine, nicotine, tonic water, malaria medications, alcohol, aspirin- please tell MD if you are taking any of these medications  >In a quiet environment or while sleeping, use a white noise generator or radio station to provide background noise  >Relaxation and stress management techniques are useful  >Biofeedback  >Complementary medicine may be of benefit  >Wear a hearing aid or tinnitus masker/retrainer  >Psychological counseling may  be beneficial in some situations  >Exercise!!  >Restorative Sleep!!    Medical therapy for ringing (may include)-  Do nothing  Medications for ringing  IV medication  Ear perfusion- inner ear surgery to reduce ringing  Hearing Aids, Tinnitus maskers, Tinnitus retrainers  Biofeedback  Psychological counseling    All options will be reviewed at your visit. Treating tinnitus can be difficult and frustrating. There really is no cure but rather control. This can be time consuming to treat. The patient determines how much treatment is warranted if there are no associated medical conditions requiring therapy. Please be patient.    TEMPOROMANDIBULAR JOINT EXERCISES     The temporomandibular joint, or the TMJ, is the jaw joint. This joint can frequently be a source of pain in the head and neck region. Typically because of its location, pain is felt either in area just in front of the ear, or in the ear itself. However, pain in the TMJ can be referred to any part of the head and neck.     An examination by the physician frequently reveals tenderness around the joint. Oftentimes, a crack or pop can also be felt. This may be an indication that the pain is in all actuality coming from the joint. An exhaustive search for a cause is carried out, in an effort to determine the etiology of the pain. Pain can be caused by grinding of teeth at night (bruxism), overuse (gum chewing, ice cracking, over opening mouth), poor dentition and malocclusion (bad bite). In an attempt to be thorough, your physician may involve others who have experience in this field, such as oral surgeons, dentists and pain experts.     Should you be diagnosed with TMJ pain, a course of conservative treatment is indicated, as this works in an overwhelming majority of cases. Because this pain originates from a joint, the treatment is similar to treatment for pain in other joints.  Treatment     Rest:  This is indicated to relieve the pressure on the joint. No  chewing gum, tough meat, hard bread, cracking ice in the teeth, or any other activity that places undue stress on the joint. Simply, if it causes it to hurt, stop doing it. This may be required for an unspecified period of time, usually 1 to 2 weeks.     Cold to the area:  This will reduce swelling and pain early in the course.     Heat to the area:  This improves blood flow to the area and speeds healing.     Pain Relievers:  Anti-inflammatory medications, such as aspirin, Motrin, Advil, Nuprin, Aleve or any other products in this category are satisfactory to use in the face of joint pain. Rarely are narcotics required, except possibly in the acute phase to gain some initial relief.  Dr. Millan may administer pain blocks to relieve severe pain and spasm. Nerve blocks may also be used.    Recommendations:  ?       Reduce/eliminate caffeinated drinks  ?       Reduce high sugar drinks and foods  ?       Get plenty of rest  ?       Practice relaxation techniques; Yoga, Biofeedback, Flaquito Chi, etc.  ?       Exercise for overall fitness  ?       Eat healthy- High fiber, low fat/cholesterol eating, and change eating habits. We recommend Sugar Busters as a lifestyle change for eating.  ?       See your dentist regularly for checkups and bite checks.  Rehabilitation:  Once the acute pain has been controlled, you should begin exercises to strengthen and rehabilitate the TMJ.     Exercises: These should be performed for five minutes at least five times each day     Slowly open the mouth to its fullest extent, even using the fingers to exert gentle pressure.     Open and close the mouth as widely and rapidly as possible.     Open and close the mouth against resistance by placing the open palm underneath the chin, or the fingers on top of the chin.     Move the lower jaw side to side without resistance. Later, add resistance by placing both hands on either side of the jaw.     Push (protrude) the lower jaw without resistance.  Later add resistance.     Should the above regimen fail to lead to improvement, your physician will discuss with you other forms of therapy. Since almost all types of TMJ pain respond to conservative therapy, surgery is indicated only after exhausting the rehabilitation. Dr. Millan does not perform rehabilitative surgery on the temporomandibular joint, but refers patients to an oral surgery who specialize in this type of surgery.     APPLICATION OF HEAT AND ICE    At times, judicious application of heat or ice can accelerate healing, diminish swelling and reduce pain. Dr. Millan will frequently prescribe heat, ice or both as part of the treatment of your injury or surgery.     How to apply ice:  Never apply ice directly to the skin. Always place an insulating layer, such as a washcloth or ice bag, between the ice and the skin.  Ice bags can be made of zip lock bags, water and ice, wrapped in a washcloth. Do not fill the bag too full and this will conform well around curves of the body, head and neck.  If your injury has just occurred, remember rest, ice, compression, and elevation (RICE). In an acute injury, ice is best applied for 48 to 72 hours to minimize swelling and pain. Doing this as often as possible (at least 4 times daily, but constantly if possible).     How to apply heat:  Never apply a heating pad while sleeping. This may lead to a burn. Heating pads apply continuous heat that can build up while sleeping.  Hot water bottles are excellent for applying heat.  Make a hot compress by heating a washcloth in the microwave, placing it in a zip lock bag and apply to the affected area.  You can use either dry heat or moist heat, whichever feels the best. Using moist heat will loosen scabbing and crusting, making the scabs easier to remove. This will also unstick eyelids that are stuck together.     Apply heat  for at least 15-20 minutes 4-5 times daily for 3 days  Apply to R face and ear    After 48 to 72  hours, begin to alternate heat and ice application for 15 minutes each, several times daily.    NASAL SALINE:  Use 2 puffs each nostril 4-6 times daily and more frequently if possible.  You can buy saline spray or you can make your own and use an old spray bottle to administer  Use a humidifier at bedside  Recipe for saline:d  Water                                 1 quart  Salt (table)                        1 tablespoon  Gylcerin (or Felipa Syrup)    1 teaspoon  Sodium bicarbonate           1 teaspoon  Sprays or Munith pots are recommended    Nasal steroid use:  Using nasal steroids:  You will be prescribed one of the following nasal steroids: Flonase, Nasacort, Nasonex, Rhinocort, Qnasl, Zetonna  2 puffs each nostril 2 times daily  Start as soon as possible  If you are using Afrin for 3 days with the nasal steroid,  Use Afrin first and wait 10 minutes to allow the nose to open. Then administer nasal steroids.    Advil 3 times a day for one month    You will get a Meniere's test    Low sodium diet- 2000 mg

## 2019-08-28 ENCOUNTER — PROCEDURE VISIT (OUTPATIENT)
Dept: OTOLARYNGOLOGY | Facility: CLINIC | Age: 39
End: 2019-08-28

## 2019-08-28 ENCOUNTER — OFFICE VISIT (OUTPATIENT)
Dept: OTOLARYNGOLOGY | Facility: CLINIC | Age: 39
End: 2019-08-28

## 2019-08-28 DIAGNOSIS — R42 VERTIGO: ICD-10-CM

## 2019-08-28 DIAGNOSIS — M26.621 ARTHRALGIA OF RIGHT TEMPOROMANDIBULAR JOINT: ICD-10-CM

## 2019-08-28 DIAGNOSIS — M79.18 MYOFASCIAL PAIN ON RIGHT SIDE: ICD-10-CM

## 2019-08-28 DIAGNOSIS — H93.13 TINNITUS, BILATERAL: Primary | ICD-10-CM

## 2019-08-28 DIAGNOSIS — J32.0 SINUSITIS, MAXILLARY, CHRONIC: ICD-10-CM

## 2019-08-28 DIAGNOSIS — H81.01 MENIERE DISEASE, RIGHT: ICD-10-CM

## 2019-08-28 DIAGNOSIS — H73.891 TENSOR TYMPANI SPASM DISORDER, RIGHT: ICD-10-CM

## 2019-08-28 PROCEDURE — 92557 COMPREHENSIVE HEARING TEST: CPT | Performed by: AUDIOLOGIST-HEARING AID FITTER

## 2019-08-28 PROCEDURE — 92570 ACOUSTIC IMMITANCE TESTING: CPT | Performed by: AUDIOLOGIST-HEARING AID FITTER

## 2019-08-28 PROCEDURE — 92584 ELECTROCOCHLEOGRAPHY: CPT | Performed by: AUDIOLOGIST-HEARING AID FITTER

## 2019-08-28 PROCEDURE — 99214 OFFICE O/P EST MOD 30 MIN: CPT | Performed by: OTOLARYNGOLOGY

## 2019-08-28 NOTE — PROGRESS NOTES
Homer Millan Jr, MD     FOLLOW UP NOTE     Chief Complaint   Patient presents with   • Follow-up        HISTORY OF PRESENT ILLNESS:   Accompanied by:    Marguerite Bush is a  38 y.o. female who is here for follow up. Hearing test completed.  She is feeling ok today. She has fewer completes.  Soma- She harmon for the next one. She has tension.  Mouthguard- not using. She cannot fit well with [employer] brand    Review of Systems  Reviewed per patient intake note and confirmed by me    Past History:  Past medical and surgical history, family history and social history reviewed and updated when appropriate.  Current medications and allergies reviewed and updated when appropriate.  Allergies:  Allegra [fexofenadine]; Doxycycline; Amoxicillin; Lortab [hydrocodone-acetaminophen]; and Penicillins        Vital Signs:        EXAMINATION:   CONSTITIONAL: well nourished, well-developed, alert, oriented, in no acute distress , small, looks happier today     BODY HABITUS: Normal body habitus      COMMUNICATION: able to communicate normally, normal voice quality     HEAD: Normocephalic, without obvious abnormality, atraumatic     FACE: structure normal, no tenderness present, no lesions/masses, no evidence of trauma     SALIVARY GLANDS: parotid glands with no tenderness, no swelling, no masses, submandibular glands with normal size, nontender      EYE: ocular motility normal, eyelids normal, orbits normal, no proptosis, conjunctiva clear, sclera non-icteric, pupils equal, round, reactive to light and accomodation     HEARING: response to conversational voice normal     EARS: Otoscopic exam   Bilateral  normal pinna with no lesions, Canals normal size and shape, Tympanic membranes normal, Ossicular chain intact, Middle ear clear     NOSE EXTERNAL: APPEARANCE: normal, straight, with good projection, no tenderness, no lesions, no tenderness, good nasal support, patent nares     NOSE INTERNAL: Anterior rhinoscopy performed    NASALMUCOSA:    abnormal:        Bilateral- mildly red, mildly dry    NASAL PASSAGES:     abnormal   Bilateral- R>L narrowed    NASAL VALVE:     intact, good support and no nasal obstruction   SEPTUM:     mucosa abnormal   Bilateral- mildly red, mildly dry     deviation present:      deviated Right Mid cartilage mild to mod   INFERIOR TURBINATES:     normal size, non-obstructing, no lesions   SECRETIONS:     abnormal Bilateral- dry      ORAL CAVITY: Normal lips with no lesions, dentition normal for age, FOM intact without lesions and normal salivary flow, Mucosa intact without lesions, Hard and soft palate normal without lesions     OROPHARYNX: oropharyngeal mucosa normal, tonsil with normal appearance     NECK: normal appearance, no masses, no lesions, larynx normal mobility, trachea midline  tender into R>L ngle area   LARYNGEAL POSITION: normal   LARYNGEAL ELEVATION:  Normal  TRACHEA:   midline     LYMPH NODES : no adenopathy     THYROID: no overt thyromegaly, no tenderness, nodules or mass present on palpation, position midline     CHEST/RESPIRATORY: respiratory effort normal, no rales, rubs or wheezing, no stridor, normal appearance to chest     CARDIOVASCULAR: regular rate and rhythm, no murmurs, gallups, no peripheral edema     NEUROPSYCHIATRIC: oriented appropriately for age, mood normal, affect appropriate, cranial nerves intact grossly (unless specifically described), gait normal for age  Affect much less flat     RESULTS REVIEW:    I have personally reviewed the audiogram with normal hearing.   ECoG normal     Assessment    Diagnosis Plan   1. Tinnitus, bilateral      Improved with myofascial pain improvement   2. Vertigo      Improved with decreased myofascial pain   3. Myofascial pain on right side      Better today   4. Meniere disease, right      Neg ECoG   5. Arthralgia of right temporomandibular joint      Improved   6. Sinusitis, maxillary, chronic      Cyst   7. Tensor tympani spasm disorder,  right      Improved       Plan    Conservative management.   She has no Meniere's today. She has bulging disc.  She will have this evaluated.  Patient is improved with myofascial pain improvement.  She will stop Soma.  I have little medication to offer. Extensive discussion regarding my thoughts. I feel she may have Autonomic dysautonomia.  I will refer for disc evaluation to see if she has indication for surgery.  She will return if she gets no relief.  TMJ regimen  Tinnitus recommendations  Vest Exercise  Continue current medications, nothing new from me today.  Patient, Spouse understands and agrees with the treatment plan as described.       Return if symptoms worsen or fail to improve, for recheck vertigo and tinnitus.    Homer Millan Jr, MD  08/28/19  5:06 PM

## 2019-08-28 NOTE — PROGRESS NOTES
Veronica Rollins LPN   Patient Intake Note    Review of Systems  Review of Systems   Constitutional: Negative for chills, fatigue and fever.   HENT:        See HPi   Neurological: Positive for dizziness, light-headedness and headaches.   Psychiatric/Behavioral: Negative for sleep disturbance.       QUALITY MEASURES    Tobacco Use: Screening and Cessation Intervention  Social History    Tobacco Use      Smoking status: Never Smoker      Smokeless tobacco: Never Used        Veronica Rollins LPN  8/28/2019  3:27 PM

## 2019-08-28 NOTE — PATIENT INSTRUCTIONS
TINNITUS  Tinnitus (latin for ringing) is the sensation of noise in the ears. This symptom can be quite variable and disconcerting. Most people have had ringing in the ears but most do not require treatment. Only 6% of people have ringing troubling enough to seek treatment.    Symptoms can range from ringing to “noise” of any sort in the ear or ears. Timing can vary as well. There are no specific symptom requirements to have tinnitus. It is speculated that the inner ear hair cells (responsible for hearing) may be dying and causing the brain to seek additional input for sound that is missing. There are many theories for the etiology of tinnitus or ringing.    You may be referred for hearing testing or imaging of the ears/brain to try to determine what is causing ringing and how to best treat the condition.    Tinnitus Recommendations-  >Avoid loud or sudden noise  >Wear hearing protection in the presence of loud noise  >Avoid irritants like caffeine, nicotine, tonic water, malaria medications, alcohol, aspirin- please tell MD if you are taking any of these medications  >In a quiet environment or while sleeping, use a white noise generator or radio station to provide background noise  >Relaxation and stress management techniques are useful  >Biofeedback  >Complementary medicine may be of benefit  >Wear a hearing aid or tinnitus masker/retrainer  >Psychological counseling may be beneficial in some situations  >Exercise!!  >Restorative Sleep!!    Medical therapy for ringing (may include)-  Do nothing  Medications for ringing  IV medication  Ear perfusion- inner ear surgery to reduce ringing  Hearing Aids, Tinnitus maskers, Tinnitus retrainers  Biofeedback  Psychological counseling    All options will be reviewed at your visit. Treating tinnitus can be difficult and frustrating. There really is no cure but rather control. This can be time consuming to treat. The patient determines how much treatment is warranted if there  "are no associated medical conditions requiring therapy. Please be patient.    VESTIBULAR EXERCISES:    Goals:  >To develop proprioceptive and visual mechanisms to compensate for a disturbance in balance function (labyrinthine system)  >To improve muscle coordination in general  T>o practice balancing under everyday conditions with special attention to using the eyes,  muscle and joint senses  >To train the movement of the eyes independent of the head  >To loosen the muscles of the neck and shoulder to overcome the protective muscular spasm and tendency to move \"in one piece\"  >To practice head movements that cause dizziness and thus gradually overcome the disability  >To encourage the restoration of self-confidence and easy spontaneous motion     Exercise Program:  A. Eye exercises (while seated in bed). Perform 5 to 10 minutes at least 5 times each day; first do slowly, then quickly. Perform 20 times each.  1.     Up and down  2.     Side to side  3.     Diagonal  4.     Focus on finger moving from 3 feet to one foot from face    B. Head exercises. To be done at first slowly with eyes open in primary position, then quickly. Later do with eyes closed. Perform 20 times each.  1.     Bending forward and backward  2.     Turning from side to side  3.     Tilting from side to side  4.     Diagonal movements    C. Coordinate the movement of head and eyes in the same direction as in B.    D. Shoulder shrugging and circling. Perform 20 times each.    E. While seated, bend forward and  objects from the ground. Perform 20 times.    F.  Standing exercises. Perform 20 times each.  1. Repeat section A  2. Change from a sitting to a standing position with the eyes open and shut.  3. Throw ball from hand to hand, above eye level             4. Throw ball from hand to hand, under knee  5. Change form from sitting to standing, turning around in between     Full activity: Perform 10 times each.  1. Walk across room with eyes " open, then closed  2. Walk up and down a slope with eyes open, then closed  3. Walk up and down steps with eyes open, then closed  4. Sit up and lie down in bed  5. Stand up and sit down in a chair  6. Recover balance when pushed in any direction  7. Throw and catch a ball  8. Any game involving stooping, straining and aiming     The following are of value in rehabilitating patients with balance problems:  1. A light cane may be used, not for walking, but to provide additional proprioceptive            orienting input.  2. While walking, the patient should not look down, but rather select a distant point for          fixation.  3. Night-lights should be left on in the patient’s home.  4. In-patients with cervical spine problems, especially in those that head turning        increase unsteadiness, the use of soft foam cervical collar limits motion and improves         stability. Exercises should be modified accordingly.  5. Mild central stimulants (Ritalin, Caffeine) are useful in alerting the patient to respond       quickly to orienting input.  6. Optimal visual correction should be achieved and maintained. If cataract surgery is           being performed, then contact lens are recommended to avoid optical distortion.  7.  Extreme fatigue and stress is to be avoided, as this may worsen the dizziness.  8.  Sedatives, vestibular suppressants and tranquilizers (Valium, Phenergan, Antivert, Dramamine, Klonopin, etc.) are to be avoided, as this may slow compensation by the brain and cause drowsiness.    TEMPOROMANDIBULAR JOINT EXERCISES     The temporomandibular joint, or the TMJ, is the jaw joint. This joint can frequently be a source of pain in the head and neck region. Typically because of its location, pain is felt either in area just in front of the ear, or in the ear itself. However, pain in the TMJ can be referred to any part of the head and neck.     An examination by the physician frequently reveals tenderness  around the joint. Oftentimes, a crack or pop can also be felt. This may be an indication that the pain is in all actuality coming from the joint. An exhaustive search for a cause is carried out, in an effort to determine the etiology of the pain. Pain can be caused by grinding of teeth at night (bruxism), overuse (gum chewing, ice cracking, over opening mouth), poor dentition and malocclusion (bad bite). In an attempt to be thorough, your physician may involve others who have experience in this field, such as oral surgeons, dentists and pain experts.     Should you be diagnosed with TMJ pain, a course of conservative treatment is indicated, as this works in an overwhelming majority of cases. Because this pain originates from a joint, the treatment is similar to treatment for pain in other joints.  Treatment     Rest:  This is indicated to relieve the pressure on the joint. No chewing gum, tough meat, hard bread, cracking ice in the teeth, or any other activity that places undue stress on the joint. Simply, if it causes it to hurt, stop doing it. This may be required for an unspecified period of time, usually 1 to 2 weeks.     Cold to the area:  This will reduce swelling and pain early in the course.     Heat to the area:  This improves blood flow to the area and speeds healing.     Pain Relievers:  Anti-inflammatory medications, such as aspirin, Motrin, Advil, Nuprin, Aleve or any other products in this category are satisfactory to use in the face of joint pain. Rarely are narcotics required, except possibly in the acute phase to gain some initial relief.  Dr. Millan may administer pain blocks to relieve severe pain and spasm. Nerve blocks may also be used.    Recommendations:  ?       Reduce/eliminate caffeinated drinks  ?       Reduce high sugar drinks and foods  ?       Get plenty of rest  ?       Practice relaxation techniques; Yoga, Biofeedback, Flaquito Chi, etc.  ?       Exercise for overall fitness  ?       Eat  healthy- High fiber, low fat/cholesterol eating, and change eating habits. We recommend Sugar Busters as a lifestyle change for eating.  ?       See your dentist regularly for checkups and bite checks.  Rehabilitation:  Once the acute pain has been controlled, you should begin exercises to strengthen and rehabilitate the TMJ.     Exercises: These should be performed for five minutes at least five times each day     Slowly open the mouth to its fullest extent, even using the fingers to exert gentle pressure.     Open and close the mouth as widely and rapidly as possible.     Open and close the mouth against resistance by placing the open palm underneath the chin, or the fingers on top of the chin.     Move the lower jaw side to side without resistance. Later, add resistance by placing both hands on either side of the jaw.     Push (protrude) the lower jaw without resistance. Later add resistance.     Should the above regimen fail to lead to improvement, your physician will discuss with you other forms of therapy. Since almost all types of TMJ pain respond to conservative therapy, surgery is indicated only after exhausting the rehabilitation. Dr. Millan does not perform rehabilitative surgery on the temporomandibular joint, but refers patients to an oral surgery who specialize in this type of surgery.

## 2019-10-03 ENCOUNTER — OFFICE VISIT (OUTPATIENT)
Dept: GASTROENTEROLOGY | Facility: CLINIC | Age: 39
End: 2019-10-03

## 2019-10-03 VITALS
HEIGHT: 59 IN | HEART RATE: 72 BPM | DIASTOLIC BLOOD PRESSURE: 76 MMHG | OXYGEN SATURATION: 99 % | SYSTOLIC BLOOD PRESSURE: 126 MMHG | TEMPERATURE: 98 F | WEIGHT: 99 LBS | BODY MASS INDEX: 19.96 KG/M2

## 2019-10-03 DIAGNOSIS — R19.8 ALTERED BOWEL FUNCTION: ICD-10-CM

## 2019-10-03 DIAGNOSIS — K21.9 GASTROESOPHAGEAL REFLUX DISEASE, ESOPHAGITIS PRESENCE NOT SPECIFIED: ICD-10-CM

## 2019-10-03 DIAGNOSIS — K22.70 BARRETT'S ESOPHAGUS DETERMINED BY BIOPSY: Primary | ICD-10-CM

## 2019-10-03 PROCEDURE — 99214 OFFICE O/P EST MOD 30 MIN: CPT | Performed by: NURSE PRACTITIONER

## 2019-10-03 RX ORDER — POLYETHYLENE GLYCOL 3350 17 G/17G
17 POWDER, FOR SOLUTION ORAL AS NEEDED
COMMUNITY

## 2019-10-03 RX ORDER — DOCUSATE SODIUM 100 MG/1
100 CAPSULE, LIQUID FILLED ORAL 2 TIMES DAILY
COMMUNITY
End: 2023-03-09

## 2019-10-03 NOTE — H&P (VIEW-ONLY)
Chief Complaint   Patient presents with   • Endoscopy     had endo 6-2018 had endo in [place] reflux getting worse    • Colonoscopy     constipation never had colon       PCP: Kandi Dueñas APRN  REFER: Kandi Dueñas APRN    Subjective     HPI    Marguerite Bush is a 38 y.o. female who presents with hx of GERD for several years.  This is described as increasing.   She is maintained on Omeprazole daily.  She has a history of Barretts esophagus determined by biopsy (diangosed 6/2018). The course is constant.  Last EGD from 6/2018  Reviewed (referral note).  She does not have complaints of :no emesis.  no changes in bowels, no wt loss, no BRBPR.  No melena.  She does have intermittent upper abdominal burning, worse after eating.    She feels solid food become stuck in mid esophagus.  Drinking repeatedly will provide relief.  No regurgitation required for relief.    Bowels will move once every 5 days.  Taking ex lax will result in 2-3 BM per week. miralax daily x 6 months provided minimal relief.  linzess worked x 2-3 months then stopped working. No lower abdominal pain.  No rectal bleeding.  No weight loss.   No previous colonoscopy.     EGD 7/2018-biopsy positive intestinal metaplasia-negative dysplasia     Past Medical History:   Diagnosis Date   • Anxiety    • Asthma    • Controlled type 2 diabetes mellitus without complication, without long-term current use of insulin (CMS/[facility])    • Depression    • Hyperlipidemia    • Hypertension    • Peptic ulcer    • Statin intolerance      Outpatient Medications Marked as Taking for the 10/3/19 encounter (Office Visit) with Amaury Buckley APRN   Medication Sig Dispense Refill   • aspirin 81 MG EC tablet Take 1 tablet by mouth Daily.     • carisoprodol (SOMA) 250 MG tablet Take 1 tablet by mouth 2 (Two) Times a Day. 60 tablet 1   • citalopram (CeleXA) 10 MG tablet Take 20 mg by mouth Daily.     • docusate sodium (STOOL SOFTENER) 100 MG capsule Take 100 mg by mouth  2 (Two) Times a Day.     • ezetimibe (ZETIA) 10 MG tablet Take 10 mg by mouth Daily.     • gabapentin (NEURONTIN) 300 MG capsule Take 300 mg by mouth Daily.     • omeprazole (priLOSEC) 20 MG capsule Take 20 mg by mouth 2 (Two) Times a Day.     • polyethylene glycol (MIRALAX) packet Take 17 g by mouth As Needed.     • Simethicone (GAS-X EXTRA STRENGTH PO) Take  by mouth.       Allergies   Allergen Reactions   • Allegra [Fexofenadine] Anaphylaxis   • Doxycycline Shortness Of Breath   • Amoxicillin Hives   • Lortab [Hydrocodone-Acetaminophen] Unknown (See Comments)     other   • Penicillins Hives     Hives, itching, nausea and vomiting.     Social History     Socioeconomic History   • Marital status:      Spouse name: Not on file   • Number of children: Not on file   • Years of education: Not on file   • Highest education level: Not on file   Tobacco Use   • Smoking status: Former Smoker   • Smokeless tobacco: Never Used   Substance and Sexual Activity   • Alcohol use: Yes     Comment: sometimes   • Drug use: No   • Sexual activity: Defer     Family History   Problem Relation Age of Onset   • Hyperlipidemia Mother    • Hypertension Mother    • Hyperlipidemia Father    • Metabolic syndrome Father    • Hypertension Father    • Diabetes type I Maternal Aunt    • Diabetes type II Maternal Uncle    • Heart attack Maternal Grandmother 50   • Heart disease Maternal Grandmother    • Hyperlipidemia Maternal Grandmother    • Hypertension Maternal Grandmother    • Heart failure Maternal Grandmother    • Heart disease Maternal Grandfather    • Prostate cancer Maternal Grandfather    • Hypertension Paternal Grandmother    • Heart attack Paternal Grandmother    • Liver cancer Paternal Grandmother    • Dementia Paternal Grandfather    • Colon cancer Neg Hx    • Colon polyps Neg Hx      Review of Systems   Constitutional: Negative for fatigue, fever and unexpected weight change.   HENT: Negative for hearing loss, sore throat  "and voice change.    Eyes: Negative for visual disturbance.   Respiratory: Negative for cough, shortness of breath and wheezing.    Cardiovascular: Negative for chest pain and palpitations.   Gastrointestinal: Positive for abdominal pain and constipation. Negative for blood in stool and vomiting.   Endocrine: Negative for polydipsia and polyuria.   Genitourinary: Negative for difficulty urinating, dysuria, hematuria and urgency.   Musculoskeletal: Negative for joint swelling and myalgias.   Skin: Negative for color change, rash and wound.   Neurological: Negative for dizziness, tremors, seizures and syncope.   Hematological: Does not bruise/bleed easily.   Psychiatric/Behavioral: Negative for agitation and confusion. The patient is not nervous/anxious.      Objective   Vitals:    10/03/19 0914   BP: 126/76   Pulse: 72   Temp: 98 °F (36.7 °C)   SpO2: 99%   Weight: 44.9 kg (99 lb)   Height: 149.9 cm (59\")     Physical Exam   Constitutional: She is oriented to person, place, and time. She appears well-developed and well-nourished. She is cooperative.   HENT:   Head: Normocephalic and atraumatic.   Eyes: Conjunctivae are normal. Pupils are equal, round, and reactive to light. No scleral icterus.   Neck: Normal range of motion. Neck supple. No JVD present. No thyroid mass and no thyromegaly present.   Cardiovascular: Normal rate, regular rhythm and normal heart sounds. Exam reveals no gallop and no friction rub.   No murmur heard.  Pulmonary/Chest: Effort normal and breath sounds normal. No accessory muscle usage. No respiratory distress. She has no wheezes. She has no rales.   Abdominal: Soft. Normal appearance and bowel sounds are normal. She exhibits no distension, no ascites and no mass. There is no hepatosplenomegaly. There is no tenderness. There is no rebound and no guarding.   Musculoskeletal: Normal range of motion. She exhibits no edema or tenderness.     Vascular Status -  Her right foot exhibits normal foot " vasculature  and no edema. Her left foot exhibits normal foot vasculature  and no edema.  Lymphadenopathy:     She has no cervical adenopathy.   Neurological: She is alert and oriented to person, place, and time. She has normal strength. Gait normal.   Skin: Skin is warm, dry and intact. No rash noted.     Imaging Results (most recent)     None        Body mass index is 20 kg/m².  Assessment/Plan   Marguerite was seen today for endoscopy and colonoscopy.    Diagnoses and all orders for this visit:    Solomon's esophagus determined by biopsy    Altered bowel function  -     Case Request; Standing  -     Implement Anesthesia Orders Day of Procedure; Standing  -     Obtain Informed Consent; Standing  -     Case Request    Gastroesophageal reflux disease, esophagitis presence not specified  -     Case Request; Standing  -     Implement Anesthesia Orders Day of Procedure; Standing  -     Obtain Informed Consent; Standing  -     Case Request    Other orders  -     polyethylene glycol (GoLYTELY) 236 g solution; Take 3,785 mL by mouth 1 (One) Time for 1 dose. Take as directed  -     linaclotide (LINZESS) 145 MCG capsule capsule; Take 1 capsule by mouth Daily.        ESOPHAGOGASTRODUODENOSCOPY WITH ANESTHESIA (N/A)   2. COLONOSCOPY WITH ANESTHESIA    Take PPI 30 min prior to breakfast   Recommend daily use of Miralax, adjust as needed , ok to use with linzess if needed    Advised pt to stop ASA, use of NSAIDs, Fish Oil, and MV 5 days prior to procedure, per Dr Sy protocol.  Tylenol based products are ok to take.  Pt verbalized understanding.    The risk of the endoscopy were discussed in detail.  We discussed the risk of perforation (one out of 8676-4201, riskier with dilation), bleeding (one out of 500), and the rare risks of infection, adverse reaction to anesthesia, respiratory failure, cardiac failure including MI and adverse reaction to medications, etc.  We discussed consequences that could occur if a risk were to  develop such as the need for hospitalization, blood transfusion, surgical intervention, medications, pain and disability and death.  Alternatives include not doing anything, or pursuing an UGI series which only offers a diagnosis with potential less accuracy compared to egd.  The patient verbalizes understanding and agrees to proceed.      All risks, benefits, alternatives, and indications of colonoscopy procedure have been discussed with the patient. Risks to include perforation of the colon requiring possible surgery or colostomy, risk of bleeding from biopsies or removal of colon tissue, possibility of missing a colon polyp or cancer, or adverse drug reaction.  Benefits to include the diagnosis and management of disease of the colon and rectum. Alternatives to include barium enema, radiographic evaluation, lab testing or no intervention. Pt verbalizes understanding and agrees to proceed with procedure.

## 2019-10-03 NOTE — PROGRESS NOTES
Chief Complaint   Patient presents with   • Endoscopy     had endo 6-2018 had endo in [place] reflux getting worse    • Colonoscopy     constipation never had colon       PCP: Kandi Dueñas APRN  REFER: Kandi Dueñas APRN    Subjective     HPI    Marguerite Bush is a 38 y.o. female who presents with hx of GERD for several years.  This is described as increasing.   She is maintained on Omeprazole daily.  She has a history of Barretts esophagus determined by biopsy (diangosed 6/2018). The course is constant.  Last EGD from 6/2018  Reviewed (referral note).  She does not have complaints of :no emesis.  no changes in bowels, no wt loss, no BRBPR.  No melena.  She does have intermittent upper abdominal burning, worse after eating.    She feels solid food become stuck in mid esophagus.  Drinking repeatedly will provide relief.  No regurgitation required for relief.    Bowels will move once every 5 days.  Taking ex lax will result in 2-3 BM per week. miralax daily x 6 months provided minimal relief.  linzess worked x 2-3 months then stopped working. No lower abdominal pain.  No rectal bleeding.  No weight loss.   No previous colonoscopy.     EGD 7/2018-biopsy positive intestinal metaplasia-negative dysplasia     Past Medical History:   Diagnosis Date   • Anxiety    • Asthma    • Controlled type 2 diabetes mellitus without complication, without long-term current use of insulin (CMS/[facility])    • Depression    • Hyperlipidemia    • Hypertension    • Peptic ulcer    • Statin intolerance      Outpatient Medications Marked as Taking for the 10/3/19 encounter (Office Visit) with Amaury Buckley APRN   Medication Sig Dispense Refill   • aspirin 81 MG EC tablet Take 1 tablet by mouth Daily.     • carisoprodol (SOMA) 250 MG tablet Take 1 tablet by mouth 2 (Two) Times a Day. 60 tablet 1   • citalopram (CeleXA) 10 MG tablet Take 20 mg by mouth Daily.     • docusate sodium (STOOL SOFTENER) 100 MG capsule Take 100 mg by mouth  2 (Two) Times a Day.     • ezetimibe (ZETIA) 10 MG tablet Take 10 mg by mouth Daily.     • gabapentin (NEURONTIN) 300 MG capsule Take 300 mg by mouth Daily.     • omeprazole (priLOSEC) 20 MG capsule Take 20 mg by mouth 2 (Two) Times a Day.     • polyethylene glycol (MIRALAX) packet Take 17 g by mouth As Needed.     • Simethicone (GAS-X EXTRA STRENGTH PO) Take  by mouth.       Allergies   Allergen Reactions   • Allegra [Fexofenadine] Anaphylaxis   • Doxycycline Shortness Of Breath   • Amoxicillin Hives   • Lortab [Hydrocodone-Acetaminophen] Unknown (See Comments)     other   • Penicillins Hives     Hives, itching, nausea and vomiting.     Social History     Socioeconomic History   • Marital status:      Spouse name: Not on file   • Number of children: Not on file   • Years of education: Not on file   • Highest education level: Not on file   Tobacco Use   • Smoking status: Former Smoker   • Smokeless tobacco: Never Used   Substance and Sexual Activity   • Alcohol use: Yes     Comment: sometimes   • Drug use: No   • Sexual activity: Defer     Family History   Problem Relation Age of Onset   • Hyperlipidemia Mother    • Hypertension Mother    • Hyperlipidemia Father    • Metabolic syndrome Father    • Hypertension Father    • Diabetes type I Maternal Aunt    • Diabetes type II Maternal Uncle    • Heart attack Maternal Grandmother 50   • Heart disease Maternal Grandmother    • Hyperlipidemia Maternal Grandmother    • Hypertension Maternal Grandmother    • Heart failure Maternal Grandmother    • Heart disease Maternal Grandfather    • Prostate cancer Maternal Grandfather    • Hypertension Paternal Grandmother    • Heart attack Paternal Grandmother    • Liver cancer Paternal Grandmother    • Dementia Paternal Grandfather    • Colon cancer Neg Hx    • Colon polyps Neg Hx      Review of Systems   Constitutional: Negative for fatigue, fever and unexpected weight change.   HENT: Negative for hearing loss, sore throat  "and voice change.    Eyes: Negative for visual disturbance.   Respiratory: Negative for cough, shortness of breath and wheezing.    Cardiovascular: Negative for chest pain and palpitations.   Gastrointestinal: Positive for abdominal pain and constipation. Negative for blood in stool and vomiting.   Endocrine: Negative for polydipsia and polyuria.   Genitourinary: Negative for difficulty urinating, dysuria, hematuria and urgency.   Musculoskeletal: Negative for joint swelling and myalgias.   Skin: Negative for color change, rash and wound.   Neurological: Negative for dizziness, tremors, seizures and syncope.   Hematological: Does not bruise/bleed easily.   Psychiatric/Behavioral: Negative for agitation and confusion. The patient is not nervous/anxious.      Objective   Vitals:    10/03/19 0914   BP: 126/76   Pulse: 72   Temp: 98 °F (36.7 °C)   SpO2: 99%   Weight: 44.9 kg (99 lb)   Height: 149.9 cm (59\")     Physical Exam   Constitutional: She is oriented to person, place, and time. She appears well-developed and well-nourished. She is cooperative.   HENT:   Head: Normocephalic and atraumatic.   Eyes: Conjunctivae are normal. Pupils are equal, round, and reactive to light. No scleral icterus.   Neck: Normal range of motion. Neck supple. No JVD present. No thyroid mass and no thyromegaly present.   Cardiovascular: Normal rate, regular rhythm and normal heart sounds. Exam reveals no gallop and no friction rub.   No murmur heard.  Pulmonary/Chest: Effort normal and breath sounds normal. No accessory muscle usage. No respiratory distress. She has no wheezes. She has no rales.   Abdominal: Soft. Normal appearance and bowel sounds are normal. She exhibits no distension, no ascites and no mass. There is no hepatosplenomegaly. There is no tenderness. There is no rebound and no guarding.   Musculoskeletal: Normal range of motion. She exhibits no edema or tenderness.     Vascular Status -  Her right foot exhibits normal foot " vasculature  and no edema. Her left foot exhibits normal foot vasculature  and no edema.  Lymphadenopathy:     She has no cervical adenopathy.   Neurological: She is alert and oriented to person, place, and time. She has normal strength. Gait normal.   Skin: Skin is warm, dry and intact. No rash noted.     Imaging Results (most recent)     None        Body mass index is 20 kg/m².  Assessment/Plan   Marguerite was seen today for endoscopy and colonoscopy.    Diagnoses and all orders for this visit:    Solomon's esophagus determined by biopsy    Altered bowel function  -     Case Request; Standing  -     Implement Anesthesia Orders Day of Procedure; Standing  -     Obtain Informed Consent; Standing  -     Case Request    Gastroesophageal reflux disease, esophagitis presence not specified  -     Case Request; Standing  -     Implement Anesthesia Orders Day of Procedure; Standing  -     Obtain Informed Consent; Standing  -     Case Request    Other orders  -     polyethylene glycol (GoLYTELY) 236 g solution; Take 3,785 mL by mouth 1 (One) Time for 1 dose. Take as directed  -     linaclotide (LINZESS) 145 MCG capsule capsule; Take 1 capsule by mouth Daily.        ESOPHAGOGASTRODUODENOSCOPY WITH ANESTHESIA (N/A)   2. COLONOSCOPY WITH ANESTHESIA    Take PPI 30 min prior to breakfast   Recommend daily use of Miralax, adjust as needed , ok to use with linzess if needed    Advised pt to stop ASA, use of NSAIDs, Fish Oil, and MV 5 days prior to procedure, per Dr Sy protocol.  Tylenol based products are ok to take.  Pt verbalized understanding.    The risk of the endoscopy were discussed in detail.  We discussed the risk of perforation (one out of 5129-5204, riskier with dilation), bleeding (one out of 500), and the rare risks of infection, adverse reaction to anesthesia, respiratory failure, cardiac failure including MI and adverse reaction to medications, etc.  We discussed consequences that could occur if a risk were to  develop such as the need for hospitalization, blood transfusion, surgical intervention, medications, pain and disability and death.  Alternatives include not doing anything, or pursuing an UGI series which only offers a diagnosis with potential less accuracy compared to egd.  The patient verbalizes understanding and agrees to proceed.      All risks, benefits, alternatives, and indications of colonoscopy procedure have been discussed with the patient. Risks to include perforation of the colon requiring possible surgery or colostomy, risk of bleeding from biopsies or removal of colon tissue, possibility of missing a colon polyp or cancer, or adverse drug reaction.  Benefits to include the diagnosis and management of disease of the colon and rectum. Alternatives to include barium enema, radiographic evaluation, lab testing or no intervention. Pt verbalizes understanding and agrees to proceed with procedure.

## 2019-10-03 NOTE — H&P (VIEW-ONLY)
Chief Complaint   Patient presents with   • Endoscopy     had endo 6-2018 had endo in [place] reflux getting worse    • Colonoscopy     constipation never had colon       PCP: Kandi Dueñas APRN  REFER: Kandi Dueñas APRN    Subjective     HPI    Marguerite Bush is a 38 y.o. female who presents with hx of GERD for several years.  This is described as increasing.   She is maintained on Omeprazole daily.  She has a history of Barretts esophagus determined by biopsy (diangosed 6/2018). The course is constant.  Last EGD from 6/2018  Reviewed (referral note).  She does not have complaints of :no emesis.  no changes in bowels, no wt loss, no BRBPR.  No melena.  She does have intermittent upper abdominal burning, worse after eating.    She feels solid food become stuck in mid esophagus.  Drinking repeatedly will provide relief.  No regurgitation required for relief.    Bowels will move once every 5 days.  Taking ex lax will result in 2-3 BM per week. miralax daily x 6 months provided minimal relief.  linzess worked x 2-3 months then stopped working. No lower abdominal pain.  No rectal bleeding.  No weight loss.   No previous colonoscopy.     EGD 7/2018-biopsy positive intestinal metaplasia-negative dysplasia     Past Medical History:   Diagnosis Date   • Anxiety    • Asthma    • Controlled type 2 diabetes mellitus without complication, without long-term current use of insulin (CMS/[facility])    • Depression    • Hyperlipidemia    • Hypertension    • Peptic ulcer    • Statin intolerance      Outpatient Medications Marked as Taking for the 10/3/19 encounter (Office Visit) with Amaury Buckley APRN   Medication Sig Dispense Refill   • aspirin 81 MG EC tablet Take 1 tablet by mouth Daily.     • carisoprodol (SOMA) 250 MG tablet Take 1 tablet by mouth 2 (Two) Times a Day. 60 tablet 1   • citalopram (CeleXA) 10 MG tablet Take 20 mg by mouth Daily.     • docusate sodium (STOOL SOFTENER) 100 MG capsule Take 100 mg by mouth  2 (Two) Times a Day.     • ezetimibe (ZETIA) 10 MG tablet Take 10 mg by mouth Daily.     • gabapentin (NEURONTIN) 300 MG capsule Take 300 mg by mouth Daily.     • omeprazole (priLOSEC) 20 MG capsule Take 20 mg by mouth 2 (Two) Times a Day.     • polyethylene glycol (MIRALAX) packet Take 17 g by mouth As Needed.     • Simethicone (GAS-X EXTRA STRENGTH PO) Take  by mouth.       Allergies   Allergen Reactions   • Allegra [Fexofenadine] Anaphylaxis   • Doxycycline Shortness Of Breath   • Amoxicillin Hives   • Lortab [Hydrocodone-Acetaminophen] Unknown (See Comments)     other   • Penicillins Hives     Hives, itching, nausea and vomiting.     Social History     Socioeconomic History   • Marital status:      Spouse name: Not on file   • Number of children: Not on file   • Years of education: Not on file   • Highest education level: Not on file   Tobacco Use   • Smoking status: Former Smoker   • Smokeless tobacco: Never Used   Substance and Sexual Activity   • Alcohol use: Yes     Comment: sometimes   • Drug use: No   • Sexual activity: Defer     Family History   Problem Relation Age of Onset   • Hyperlipidemia Mother    • Hypertension Mother    • Hyperlipidemia Father    • Metabolic syndrome Father    • Hypertension Father    • Diabetes type I Maternal Aunt    • Diabetes type II Maternal Uncle    • Heart attack Maternal Grandmother 50   • Heart disease Maternal Grandmother    • Hyperlipidemia Maternal Grandmother    • Hypertension Maternal Grandmother    • Heart failure Maternal Grandmother    • Heart disease Maternal Grandfather    • Prostate cancer Maternal Grandfather    • Hypertension Paternal Grandmother    • Heart attack Paternal Grandmother    • Liver cancer Paternal Grandmother    • Dementia Paternal Grandfather    • Colon cancer Neg Hx    • Colon polyps Neg Hx      Review of Systems   Constitutional: Negative for fatigue, fever and unexpected weight change.   HENT: Negative for hearing loss, sore throat  "and voice change.    Eyes: Negative for visual disturbance.   Respiratory: Negative for cough, shortness of breath and wheezing.    Cardiovascular: Negative for chest pain and palpitations.   Gastrointestinal: Positive for abdominal pain and constipation. Negative for blood in stool and vomiting.   Endocrine: Negative for polydipsia and polyuria.   Genitourinary: Negative for difficulty urinating, dysuria, hematuria and urgency.   Musculoskeletal: Negative for joint swelling and myalgias.   Skin: Negative for color change, rash and wound.   Neurological: Negative for dizziness, tremors, seizures and syncope.   Hematological: Does not bruise/bleed easily.   Psychiatric/Behavioral: Negative for agitation and confusion. The patient is not nervous/anxious.      Objective   Vitals:    10/03/19 0914   BP: 126/76   Pulse: 72   Temp: 98 °F (36.7 °C)   SpO2: 99%   Weight: 44.9 kg (99 lb)   Height: 149.9 cm (59\")     Physical Exam   Constitutional: She is oriented to person, place, and time. She appears well-developed and well-nourished. She is cooperative.   HENT:   Head: Normocephalic and atraumatic.   Eyes: Conjunctivae are normal. Pupils are equal, round, and reactive to light. No scleral icterus.   Neck: Normal range of motion. Neck supple. No JVD present. No thyroid mass and no thyromegaly present.   Cardiovascular: Normal rate, regular rhythm and normal heart sounds. Exam reveals no gallop and no friction rub.   No murmur heard.  Pulmonary/Chest: Effort normal and breath sounds normal. No accessory muscle usage. No respiratory distress. She has no wheezes. She has no rales.   Abdominal: Soft. Normal appearance and bowel sounds are normal. She exhibits no distension, no ascites and no mass. There is no hepatosplenomegaly. There is no tenderness. There is no rebound and no guarding.   Musculoskeletal: Normal range of motion. She exhibits no edema or tenderness.     Vascular Status -  Her right foot exhibits normal foot " vasculature  and no edema. Her left foot exhibits normal foot vasculature  and no edema.  Lymphadenopathy:     She has no cervical adenopathy.   Neurological: She is alert and oriented to person, place, and time. She has normal strength. Gait normal.   Skin: Skin is warm, dry and intact. No rash noted.     Imaging Results (most recent)     None        Body mass index is 20 kg/m².  Assessment/Plan   Marguerite was seen today for endoscopy and colonoscopy.    Diagnoses and all orders for this visit:    Solomon's esophagus determined by biopsy    Altered bowel function  -     Case Request; Standing  -     Implement Anesthesia Orders Day of Procedure; Standing  -     Obtain Informed Consent; Standing  -     Case Request    Gastroesophageal reflux disease, esophagitis presence not specified  -     Case Request; Standing  -     Implement Anesthesia Orders Day of Procedure; Standing  -     Obtain Informed Consent; Standing  -     Case Request    Other orders  -     polyethylene glycol (GoLYTELY) 236 g solution; Take 3,785 mL by mouth 1 (One) Time for 1 dose. Take as directed  -     linaclotide (LINZESS) 145 MCG capsule capsule; Take 1 capsule by mouth Daily.        ESOPHAGOGASTRODUODENOSCOPY WITH ANESTHESIA (N/A)   2. COLONOSCOPY WITH ANESTHESIA    Take PPI 30 min prior to breakfast   Recommend daily use of Miralax, adjust as needed , ok to use with linzess if needed    Advised pt to stop ASA, use of NSAIDs, Fish Oil, and MV 5 days prior to procedure, per Dr Sy protocol.  Tylenol based products are ok to take.  Pt verbalized understanding.    The risk of the endoscopy were discussed in detail.  We discussed the risk of perforation (one out of 1665-1025, riskier with dilation), bleeding (one out of 500), and the rare risks of infection, adverse reaction to anesthesia, respiratory failure, cardiac failure including MI and adverse reaction to medications, etc.  We discussed consequences that could occur if a risk were to  develop such as the need for hospitalization, blood transfusion, surgical intervention, medications, pain and disability and death.  Alternatives include not doing anything, or pursuing an UGI series which only offers a diagnosis with potential less accuracy compared to egd.  The patient verbalizes understanding and agrees to proceed.      All risks, benefits, alternatives, and indications of colonoscopy procedure have been discussed with the patient. Risks to include perforation of the colon requiring possible surgery or colostomy, risk of bleeding from biopsies or removal of colon tissue, possibility of missing a colon polyp or cancer, or adverse drug reaction.  Benefits to include the diagnosis and management of disease of the colon and rectum. Alternatives to include barium enema, radiographic evaluation, lab testing or no intervention. Pt verbalizes understanding and agrees to proceed with procedure.

## 2019-10-16 ENCOUNTER — ANESTHESIA EVENT (OUTPATIENT)
Dept: GASTROENTEROLOGY | Facility: HOSPITAL | Age: 39
End: 2019-10-16

## 2019-10-16 ENCOUNTER — ANESTHESIA (OUTPATIENT)
Dept: GASTROENTEROLOGY | Facility: HOSPITAL | Age: 39
End: 2019-10-16

## 2019-10-16 ENCOUNTER — HOSPITAL ENCOUNTER (OUTPATIENT)
Facility: HOSPITAL | Age: 39
Setting detail: HOSPITAL OUTPATIENT SURGERY
Discharge: HOME | End: 2019-10-16
Attending: INTERNAL MEDICINE | Admitting: INTERNAL MEDICINE

## 2019-10-16 VITALS
BODY MASS INDEX: 19.86 KG/M2 | DIASTOLIC BLOOD PRESSURE: 83 MMHG | TEMPERATURE: 98.1 F | OXYGEN SATURATION: 100 % | SYSTOLIC BLOOD PRESSURE: 117 MMHG | HEART RATE: 60 BPM | RESPIRATION RATE: 13 BRPM | WEIGHT: 98.5 LBS | HEIGHT: 59 IN

## 2019-10-16 DIAGNOSIS — K21.9 GASTROESOPHAGEAL REFLUX DISEASE, ESOPHAGITIS PRESENCE NOT SPECIFIED: ICD-10-CM

## 2019-10-16 LAB — GLUCOSE BLDC GLUCOMTR-MCNC: 83 MG/DL (ref 70–130)

## 2019-10-16 PROCEDURE — 82962 GLUCOSE BLOOD TEST: CPT

## 2019-10-16 PROCEDURE — 43239 EGD BIOPSY SINGLE/MULTIPLE: CPT | Performed by: INTERNAL MEDICINE

## 2019-10-16 PROCEDURE — 88305 TISSUE EXAM BY PATHOLOGIST: CPT | Performed by: INTERNAL MEDICINE

## 2019-10-16 PROCEDURE — 25010000002 PROPOFOL 10 MG/ML EMULSION: Performed by: NURSE ANESTHETIST, CERTIFIED REGISTERED

## 2019-10-16 RX ORDER — SODIUM CHLORIDE 9 MG/ML
500 INJECTION, SOLUTION INTRAVENOUS CONTINUOUS PRN
Status: DISCONTINUED | OUTPATIENT
Start: 2019-10-16 | End: 2019-10-16 | Stop reason: HOSPADM

## 2019-10-16 RX ORDER — SODIUM CHLORIDE 0.9 % (FLUSH) 0.9 %
10 SYRINGE (ML) INJECTION AS NEEDED
Status: DISCONTINUED | OUTPATIENT
Start: 2019-10-16 | End: 2019-10-16 | Stop reason: HOSPADM

## 2019-10-16 RX ORDER — PROPOFOL 10 MG/ML
VIAL (ML) INTRAVENOUS AS NEEDED
Status: DISCONTINUED | OUTPATIENT
Start: 2019-10-16 | End: 2019-10-16 | Stop reason: SURG

## 2019-10-16 RX ADMIN — LIDOCAINE HYDROCHLORIDE 60 MG: 20 INJECTION, SOLUTION INTRAVENOUS at 11:57

## 2019-10-16 RX ADMIN — PROPOFOL 200 MG: 10 INJECTION, EMULSION INTRAVENOUS at 11:57

## 2019-10-16 RX ADMIN — SODIUM CHLORIDE 500 ML: 9 INJECTION, SOLUTION INTRAVENOUS at 11:19

## 2019-10-16 NOTE — ANESTHESIA PREPROCEDURE EVALUATION
Anesthesia Evaluation     Patient summary reviewed   no history of anesthetic complications:  NPO Solid Status: > 8 hours             Airway   Mallampati: II  TM distance: >3 FB  Neck ROM: full  Dental      Pulmonary    (+) asthma,   Cardiovascular   Exercise tolerance: excellent (>7 METS)    (+) hyperlipidemia,       Neuro/Psych- negative ROS  GI/Hepatic/Renal/Endo    (+)  GERD,  diabetes mellitus,     Musculoskeletal     Abdominal    Substance History      OB/GYN          Other                        Anesthesia Plan    ASA 2     MAC     Anesthetic plan, all risks, benefits, and alternatives have been provided, discussed and informed consent has been obtained with: patient.

## 2019-10-16 NOTE — ANESTHESIA POSTPROCEDURE EVALUATION
Patient: Marguerite Bush    Procedure Summary     Date:  10/16/19 Room / Location:  [facility] ENDOSCOPY 4 / [facility]    Anesthesia Start:  1154 Anesthesia Stop:  1206    Procedure:  ESOPHAGOGASTRODUODENOSCOPY WITH ANESTHESIA (N/A ) Diagnosis:       Gastroesophageal reflux disease, esophagitis presence not specified      (Gastroesophageal reflux disease, esophagitis presence not specified [K21.9])    Surgeon:  Lux Sy DO Provider:  Deidra Correa CRNA    Anesthesia Type:  MAC ASA Status:  2          Anesthesia Type: MAC  Last vitals  BP   104/76 (10/16/19 1215)   Temp   98.1 °F (36.7 °C) (10/16/19 1105)   Pulse   60 (10/16/19 1105)   Resp   17 (10/16/19 1215)     SpO2   100 % (10/16/19 1105)     Post Anesthesia Care and Evaluation    Patient location during evaluation: PHASE II  Patient participation: complete - patient participated  Level of consciousness: awake and alert  Pain score: 0  Pain management: adequate  Airway patency: patent  Anesthetic complications: No anesthetic complications  PONV Status: none  Cardiovascular status: acceptable  Respiratory status: acceptable  Hydration status: acceptable  No anesthesia care post op

## 2019-10-17 LAB
CYTO UR: NORMAL
LAB AP CASE REPORT: NORMAL
PATH REPORT.FINAL DX SPEC: NORMAL
PATH REPORT.GROSS SPEC: NORMAL

## 2019-10-18 ENCOUNTER — TELEPHONE (OUTPATIENT)
Dept: GASTROENTEROLOGY | Facility: CLINIC | Age: 39
End: 2019-10-18

## 2019-10-18 NOTE — TELEPHONE ENCOUNTER
----- Message from Lux Sy DO sent at 10/17/2019  6:14 PM CDT -----  Regarding: bx  Please send letter stating biopsy for Solomon's esophagus was stable    Thank you    ----- Message -----  From: Lab, Background User  Sent: 10/17/2019  10:12 AM  To: Lux Sy DO

## 2019-10-23 ENCOUNTER — ANESTHESIA (OUTPATIENT)
Dept: GASTROENTEROLOGY | Facility: HOSPITAL | Age: 39
End: 2019-10-23

## 2019-10-23 ENCOUNTER — HOSPITAL ENCOUNTER (OUTPATIENT)
Facility: HOSPITAL | Age: 39
Setting detail: HOSPITAL OUTPATIENT SURGERY
Discharge: HOME | End: 2019-10-23
Attending: INTERNAL MEDICINE | Admitting: INTERNAL MEDICINE

## 2019-10-23 ENCOUNTER — ANESTHESIA EVENT (OUTPATIENT)
Dept: GASTROENTEROLOGY | Facility: HOSPITAL | Age: 39
End: 2019-10-23

## 2019-10-23 VITALS
HEART RATE: 74 BPM | DIASTOLIC BLOOD PRESSURE: 78 MMHG | OXYGEN SATURATION: 100 % | TEMPERATURE: 97.7 F | RESPIRATION RATE: 18 BRPM | HEIGHT: 59 IN | BODY MASS INDEX: 19.76 KG/M2 | WEIGHT: 98 LBS | SYSTOLIC BLOOD PRESSURE: 111 MMHG

## 2019-10-23 DIAGNOSIS — R19.8 ALTERED BOWEL FUNCTION: ICD-10-CM

## 2019-10-23 LAB — GLUCOSE BLDC GLUCOMTR-MCNC: 80 MG/DL (ref 70–130)

## 2019-10-23 PROCEDURE — 25010000002 PROPOFOL 10 MG/ML EMULSION: Performed by: NURSE ANESTHETIST, CERTIFIED REGISTERED

## 2019-10-23 PROCEDURE — 82962 GLUCOSE BLOOD TEST: CPT

## 2019-10-23 PROCEDURE — 45378 DIAGNOSTIC COLONOSCOPY: CPT | Performed by: INTERNAL MEDICINE

## 2019-10-23 RX ORDER — SODIUM CHLORIDE 0.9 % (FLUSH) 0.9 %
10 SYRINGE (ML) INJECTION AS NEEDED
Status: DISCONTINUED | OUTPATIENT
Start: 2019-10-23 | End: 2019-10-23 | Stop reason: HOSPADM

## 2019-10-23 RX ORDER — SODIUM CHLORIDE 9 MG/ML
500 INJECTION, SOLUTION INTRAVENOUS CONTINUOUS PRN
Status: DISCONTINUED | OUTPATIENT
Start: 2019-10-23 | End: 2019-10-23 | Stop reason: HOSPADM

## 2019-10-23 RX ORDER — PROPOFOL 10 MG/ML
VIAL (ML) INTRAVENOUS AS NEEDED
Status: DISCONTINUED | OUTPATIENT
Start: 2019-10-23 | End: 2019-10-23 | Stop reason: SURG

## 2019-10-23 RX ADMIN — PROPOFOL 25 MG: 10 INJECTION, EMULSION INTRAVENOUS at 10:20

## 2019-10-23 RX ADMIN — PROPOFOL 50 MG: 10 INJECTION, EMULSION INTRAVENOUS at 10:16

## 2019-10-23 RX ADMIN — SODIUM CHLORIDE 500 ML: 9 INJECTION, SOLUTION INTRAVENOUS at 09:50

## 2019-10-23 RX ADMIN — PROPOFOL 50 MG: 10 INJECTION, EMULSION INTRAVENOUS at 10:14

## 2019-10-23 RX ADMIN — PROPOFOL 25 MG: 10 INJECTION, EMULSION INTRAVENOUS at 10:18

## 2019-10-23 RX ADMIN — PROPOFOL 50 MG: 10 INJECTION, EMULSION INTRAVENOUS at 10:12

## 2019-10-23 RX ADMIN — LIDOCAINE HYDROCHLORIDE 40 MG: 20 INJECTION, SOLUTION INTRAVENOUS at 10:12

## 2019-10-23 NOTE — ANESTHESIA POSTPROCEDURE EVALUATION
Patient: Marguerite Bush    Procedure Summary     Date:  10/23/19 Room / Location:  [facility] ENDOSCOPY 4 /  [facility]    Anesthesia Start:  1010 Anesthesia Stop:  1028    Procedure:  COLONOSCOPY WITH ANESTHESIA (N/A ) Diagnosis:       Altered bowel function      (Altered bowel function [R19.8])    Surgeon:  Lux Sy DO Provider:  Delfin Mcgee CRNA    Anesthesia Type:  MAC ASA Status:  2          Anesthesia Type: MAC  Last vitals  BP   111/78 (10/23/19 1045)   Temp   97.7 °F (36.5 °C) (10/23/19 0936)   Pulse   74 (10/23/19 1045)   Resp   18 (10/23/19 1045)     SpO2   100 % (10/23/19 1045)     Post Anesthesia Care and Evaluation    Patient location during evaluation: PHASE II  Patient participation: complete - patient participated  Level of consciousness: awake  Pain score: 0  Pain management: adequate  Airway patency: patent  Anesthetic complications: No anesthetic complications  PONV Status: none  Cardiovascular status: acceptable  Respiratory status: acceptable  Hydration status: acceptable  No anesthesia care post op

## 2019-10-24 ENCOUNTER — TELEPHONE (OUTPATIENT)
Dept: GASTROENTEROLOGY | Facility: CLINIC | Age: 39
End: 2019-10-24

## 2020-10-26 RX ORDER — LINACLOTIDE 145 UG/1
CAPSULE, GELATIN COATED ORAL
Qty: 30 CAPSULE | Refills: 0 | OUTPATIENT
Start: 2020-10-26

## 2020-11-15 NOTE — PROGRESS NOTES
"  Chief Complaint   Patient presents with   • Constipation     needs linzess refilled 145       PCP: Kandi Dueñas APRN  REFER: No ref. provider found    Subjective     HPI    Marguerite Bush is a 40 y.o. female who presents with hx of GERD for several years.  This is described as stable.   She is maintained on Omeprazole 40 mg daily (purchases OTC)  She has a history of Barretts esophagus determined by biopsy (diangosed 2018). The course is constant.  Last EGD from 2019 reviewed.  She does not have complaints of :N/V, no changes in bowels, no wt loss, no BRBPR.  No melena.  No abdominal pain.   No dysphagia.  She utilizes linzess on regular basis with improvement in constipation.  Bowels described as moving daily, \"like clock work\"  with use of linzess.  No bright red blood per rectum, no melena.   She has lost weight through dietary changes    CScope (Dr Sy) 2019    Endoscopy (Dr Sy) 2019      Past Medical History:   Diagnosis Date   • Anxiety    • Asthma    • Controlled type 2 diabetes mellitus without complication, without long-term current use of insulin (CMS/[facility])    • Depression    • Hyperlipidemia    • Hypertension    • Peptic ulcer    • Statin intolerance      Outpatient Medications Marked as Taking for the 11/16/20 encounter (Office Visit) with Amaury Buckley APRN   Medication Sig Dispense Refill   • albuterol (PROAIR HFA) 108 (90 Base) MCG/ACT inhaler Inhale 2 puffs Daily.     • aspirin 81 MG EC tablet Take 1 tablet by mouth Daily.     • citalopram (CeleXA) 10 MG tablet Take 20 mg by mouth Daily.     • docusate sodium (STOOL SOFTENER) 100 MG capsule Take 100 mg by mouth 2 (Two) Times a Day.     • ezetimibe (ZETIA) 10 MG tablet Take 10 mg by mouth Daily.     • gabapentin (NEURONTIN) 300 MG capsule Take 1 capsule by mouth 2 (Two) Times a Day. 60 capsule 1   • linaclotide (LINZESS) 145 MCG capsule capsule Take 1 capsule by mouth Daily. 30 capsule 11   • metFORMIN (GLUCOPHAGE) 500 MG tablet " Take 250 mg by mouth Daily With Breakfast.     • omeprazole (priLOSEC) 20 MG capsule Take 20 mg by mouth 2 (Two) Times a Day.     • polyethylene glycol (MIRALAX) packet Take 17 g by mouth As Needed.     • Simethicone (GAS-X EXTRA STRENGTH PO) Take  by mouth.     • tiZANidine (ZANAFLEX) 4 MG tablet Take 4 mg by mouth At Night As Needed for Muscle Spasms.     • [DISCONTINUED] linaclotide (LINZESS) 145 MCG capsule capsule Take 1 capsule by mouth Daily. 30 capsule 6     Allergies   Allergen Reactions   • Allegra [Fexofenadine] Anaphylaxis   • Doxycycline Shortness Of Breath   • Amoxicillin Hives   • Lortab [Hydrocodone-Acetaminophen] Unknown (See Comments)     other   • Penicillins Hives     Hives, itching, nausea and vomiting.     Social History     Socioeconomic History   • Marital status:      Spouse name: Not on file   • Number of children: Not on file   • Years of education: Not on file   • Highest education level: Not on file   Tobacco Use   • Smoking status: Former Smoker   • Smokeless tobacco: Never Used   Substance and Sexual Activity   • Alcohol use: Yes     Comment: sometimes   • Drug use: No   • Sexual activity: Defer     Family History   Problem Relation Age of Onset   • Hyperlipidemia Mother    • Hypertension Mother    • Hyperlipidemia Father    • Metabolic syndrome Father    • Hypertension Father    • Diabetes type I Maternal Aunt    • Diabetes type II Maternal Uncle    • Heart attack Maternal Grandmother 50   • Heart disease Maternal Grandmother    • Hyperlipidemia Maternal Grandmother    • Hypertension Maternal Grandmother    • Heart failure Maternal Grandmother    • Heart disease Maternal Grandfather    • Prostate cancer Maternal Grandfather    • Hypertension Paternal Grandmother    • Heart attack Paternal Grandmother    • Liver cancer Paternal Grandmother    • Dementia Paternal Grandfather    • Colon cancer Neg Hx    • Colon polyps Neg Hx      Review of Systems   Constitutional: Negative for  "fatigue, fever and unexpected weight change.   HENT: Negative for hearing loss, sore throat and voice change.    Eyes: Negative for visual disturbance.   Respiratory: Negative for cough, shortness of breath and wheezing.    Cardiovascular: Negative for chest pain and palpitations.   Gastrointestinal: Positive for constipation. Negative for abdominal pain, blood in stool and vomiting.   Endocrine: Negative for polydipsia and polyuria.   Genitourinary: Negative for difficulty urinating, dysuria, hematuria and urgency.   Musculoskeletal: Negative for joint swelling and myalgias.   Skin: Negative for color change, rash and wound.   Neurological: Negative for dizziness, tremors, seizures and syncope.   Hematological: Does not bruise/bleed easily.   Psychiatric/Behavioral: Negative for agitation and confusion. The patient is not nervous/anxious.      Objective   Vitals:    11/16/20 1019   BP: 120/70   Pulse: 70   Temp: 97.8 °F (36.6 °C)   SpO2: 97%   Weight: 48.5 kg (107 lb)   Height: 149.9 cm (59\")     Physical Exam  Constitutional:       Appearance: She is well-developed.   HENT:      Head: Normocephalic and atraumatic.   Eyes:      General: No scleral icterus.     Conjunctiva/sclera: Conjunctivae normal.      Pupils: Pupils are equal, round, and reactive to light.   Neck:      Thyroid: No thyroid mass or thyromegaly.      Vascular: No JVD.   Cardiovascular:      Rate and Rhythm: Normal rate and regular rhythm.      Heart sounds: Normal heart sounds. No murmur. No friction rub. No gallop.    Pulmonary:      Effort: Pulmonary effort is normal. No accessory muscle usage or respiratory distress.      Breath sounds: Normal breath sounds. No wheezing or rales.   Abdominal:      General: Bowel sounds are normal. There is no distension.      Palpations: Abdomen is soft. There is no hepatomegaly, splenomegaly or mass.      Tenderness: There is no abdominal tenderness. There is no guarding or rebound.   Genitourinary:     " Comments: Rectal-Did not examine  Musculoskeletal: Normal range of motion.   Skin:     General: Skin is warm and dry.   Neurological:      Mental Status: She is alert and oriented to person, place, and time.      Comments: Deemed a reliable historian, able to converse without difficulty and able to move all extremities without difficulty   Psychiatric:         Behavior: Behavior normal.       Imaging Results (Most Recent)     None        Body mass index is 21.61 kg/m².  Assessment/Plan   Diagnoses and all orders for this visit:    1. Gastroesophageal reflux disease, unspecified whether esophagitis present (Primary)    2. Solomon's esophagus determined by biopsy    3. Constipation, unspecified constipation type    Other orders  -     linaclotide (LINZESS) 145 MCG capsule capsule; Take 1 capsule by mouth Daily.  Dispense: 30 capsule; Refill: 11        * Surgery not found *    Colonoscopy is up to date, procedure can be performed earlier if clinically indicated     EGD up to date, procedure can be performed at earlier date if clinically indicated    Patient's Body mass index is 21.61 kg/m². BMI is above normal parameters. Recommendations include: no follow up.        Amaury Buckley, APRN  11/16/20

## 2020-11-16 ENCOUNTER — OFFICE VISIT (OUTPATIENT)
Dept: GASTROENTEROLOGY | Facility: CLINIC | Age: 40
End: 2020-11-16

## 2020-11-16 VITALS
WEIGHT: 107 LBS | OXYGEN SATURATION: 97 % | DIASTOLIC BLOOD PRESSURE: 70 MMHG | TEMPERATURE: 97.8 F | SYSTOLIC BLOOD PRESSURE: 120 MMHG | HEIGHT: 59 IN | BODY MASS INDEX: 21.57 KG/M2 | HEART RATE: 70 BPM

## 2020-11-16 DIAGNOSIS — K59.00 CONSTIPATION, UNSPECIFIED CONSTIPATION TYPE: ICD-10-CM

## 2020-11-16 DIAGNOSIS — K22.70 BARRETT'S ESOPHAGUS DETERMINED BY BIOPSY: ICD-10-CM

## 2020-11-16 DIAGNOSIS — K21.9 GASTROESOPHAGEAL REFLUX DISEASE, UNSPECIFIED WHETHER ESOPHAGITIS PRESENT: Primary | ICD-10-CM

## 2020-11-16 PROCEDURE — 99213 OFFICE O/P EST LOW 20 MIN: CPT | Performed by: NURSE PRACTITIONER

## 2020-11-16 RX ORDER — TIZANIDINE 4 MG/1
4 TABLET ORAL NIGHTLY PRN
COMMUNITY

## 2022-05-09 ENCOUNTER — TRANSCRIBE ORDERS (OUTPATIENT)
Dept: ADMINISTRATIVE | Facility: HOSPITAL | Age: 42
End: 2022-05-09

## 2022-05-09 DIAGNOSIS — G56.02 CARPAL TUNNEL SYNDROME ON LEFT: ICD-10-CM

## 2022-05-09 DIAGNOSIS — R20.2 TINGLING: ICD-10-CM

## 2022-05-09 DIAGNOSIS — R20.0 NUMBNESS: ICD-10-CM

## 2022-05-09 DIAGNOSIS — G56.01 CARPAL TUNNEL SYNDROME ON RIGHT: Primary | ICD-10-CM

## 2022-06-01 ENCOUNTER — APPOINTMENT (OUTPATIENT)
Dept: NEUROLOGY | Facility: HOSPITAL | Age: 42
End: 2022-06-01

## 2022-07-20 ENCOUNTER — APPOINTMENT (OUTPATIENT)
Dept: NEUROLOGY | Facility: HOSPITAL | Age: 42
End: 2022-07-20

## 2023-02-24 ENCOUNTER — OFFICE VISIT (OUTPATIENT)
Dept: FAMILY MEDICINE CLINIC | Facility: CLINIC | Age: 43
End: 2023-02-24
Payer: COMMERCIAL

## 2023-02-24 VITALS
HEART RATE: 75 BPM | HEIGHT: 59 IN | WEIGHT: 119 LBS | RESPIRATION RATE: 16 BRPM | DIASTOLIC BLOOD PRESSURE: 84 MMHG | OXYGEN SATURATION: 97 % | TEMPERATURE: 98.2 F | BODY MASS INDEX: 23.99 KG/M2 | SYSTOLIC BLOOD PRESSURE: 122 MMHG

## 2023-02-24 DIAGNOSIS — F32.A ANXIETY AND DEPRESSION: ICD-10-CM

## 2023-02-24 DIAGNOSIS — F41.9 ANXIETY AND DEPRESSION: ICD-10-CM

## 2023-02-24 DIAGNOSIS — E11.9 TYPE 2 DIABETES MELLITUS WITHOUT COMPLICATION, WITHOUT LONG-TERM CURRENT USE OF INSULIN: Primary | ICD-10-CM

## 2023-02-24 DIAGNOSIS — Z86.2 H/O IRON DEFICIENCY ANEMIA: ICD-10-CM

## 2023-02-24 DIAGNOSIS — M25.50 POLYARTHRALGIA: ICD-10-CM

## 2023-02-24 DIAGNOSIS — Z13.0 SCREENING FOR DEFICIENCY ANEMIA: ICD-10-CM

## 2023-02-24 DIAGNOSIS — E78.2 MIXED HYPERLIPIDEMIA: ICD-10-CM

## 2023-02-24 DIAGNOSIS — I10 PRIMARY HYPERTENSION: Chronic | ICD-10-CM

## 2023-02-24 PROCEDURE — 99204 OFFICE O/P NEW MOD 45 MIN: CPT | Performed by: FAMILY MEDICINE

## 2023-02-24 RX ORDER — CELECOXIB 100 MG/1
1 CAPSULE ORAL EVERY 12 HOURS SCHEDULED
COMMUNITY
Start: 2023-02-08

## 2023-02-24 RX ORDER — GABAPENTIN 600 MG/1
1 TABLET ORAL 3 TIMES DAILY
COMMUNITY
Start: 2023-02-10

## 2023-02-27 LAB
ALBUMIN SERPL-MCNC: 4.6 G/DL (ref 3.5–5.2)
ALBUMIN/GLOB SERPL: 1.5 G/DL
ALP SERPL-CCNC: 60 U/L (ref 39–117)
ALT SERPL-CCNC: 16 U/L (ref 1–33)
ANA SER QL: NEGATIVE
AST SERPL-CCNC: 15 U/L (ref 1–32)
BILIRUB SERPL-MCNC: <0.2 MG/DL (ref 0–1.2)
BUN SERPL-MCNC: 19 MG/DL (ref 6–20)
BUN/CREAT SERPL: 31.7 (ref 7–25)
CALCIUM SERPL-MCNC: 9.6 MG/DL (ref 8.6–10.5)
CHLORIDE SERPL-SCNC: 100 MMOL/L (ref 98–107)
CHOLEST SERPL-MCNC: 321 MG/DL (ref 0–200)
CO2 SERPL-SCNC: 25.1 MMOL/L (ref 22–29)
CREAT SERPL-MCNC: 0.6 MG/DL (ref 0.57–1)
CRP SERPL-MCNC: <0.3 MG/DL (ref 0–0.5)
EGFRCR SERPLBLD CKD-EPI 2021: 113.7 ML/MIN/1.73
ERYTHROCYTE [DISTWIDTH] IN BLOOD BY AUTOMATED COUNT: 13.5 % (ref 12.3–15.4)
GLOBULIN SER CALC-MCNC: 3 GM/DL
GLUCOSE SERPL-MCNC: 104 MG/DL (ref 65–99)
HBA1C MFR BLD: 6.3 % (ref 4.8–5.6)
HCT VFR BLD AUTO: 35.2 % (ref 34–46.6)
HDLC SERPL-MCNC: 32 MG/DL (ref 40–60)
HGB BLD-MCNC: 11.7 G/DL (ref 12–15.9)
IRON SATN MFR SERPL: 5 % (ref 20–50)
IRON SERPL-MCNC: 32 MCG/DL (ref 37–145)
LDLC SERPL CALC-MCNC: ABNORMAL MG/DL
MCH RBC QN AUTO: 27.4 PG (ref 26.6–33)
MCHC RBC AUTO-ENTMCNC: 33.2 G/DL (ref 31.5–35.7)
MCV RBC AUTO: 82.4 FL (ref 79–97)
PLATELET # BLD AUTO: 345 10*3/MM3 (ref 140–450)
POTASSIUM SERPL-SCNC: 4.2 MMOL/L (ref 3.5–5.2)
PROT SERPL-MCNC: 7.6 G/DL (ref 6–8.5)
RBC # BLD AUTO: 4.27 10*6/MM3 (ref 3.77–5.28)
RHEUMATOID FACT SERPL-ACNC: 12 IU/ML
SODIUM SERPL-SCNC: 138 MMOL/L (ref 136–145)
TIBC SERPL-MCNC: 611 MCG/DL
TRIGL SERPL-MCNC: 1299 MG/DL (ref 0–150)
UIBC SERPL-MCNC: 579 MCG/DL (ref 112–346)
VLDLC SERPL CALC-MCNC: ABNORMAL MG/DL
WBC # BLD AUTO: 5.22 10*3/MM3 (ref 3.4–10.8)

## 2023-03-16 ENCOUNTER — OFFICE VISIT (OUTPATIENT)
Dept: CARDIOLOGY | Facility: CLINIC | Age: 43
End: 2023-03-16
Payer: COMMERCIAL

## 2023-03-16 VITALS
WEIGHT: 115 LBS | BODY MASS INDEX: 24.81 KG/M2 | HEART RATE: 63 BPM | OXYGEN SATURATION: 98 % | DIASTOLIC BLOOD PRESSURE: 84 MMHG | SYSTOLIC BLOOD PRESSURE: 120 MMHG | HEIGHT: 57 IN

## 2023-03-16 DIAGNOSIS — E11.9 CONTROLLED TYPE 2 DIABETES MELLITUS WITHOUT COMPLICATION, WITHOUT LONG-TERM CURRENT USE OF INSULIN: Chronic | ICD-10-CM

## 2023-03-16 DIAGNOSIS — I10 PRIMARY HYPERTENSION: Chronic | ICD-10-CM

## 2023-03-16 DIAGNOSIS — E78.49 FAMILIAL HYPERLIPIDEMIA: Primary | ICD-10-CM

## 2023-03-16 PROBLEM — R00.1 BRADYCARDIA: Status: RESOLVED | Noted: 2018-08-14 | Resolved: 2023-03-16

## 2023-03-16 PROCEDURE — 99204 OFFICE O/P NEW MOD 45 MIN: CPT | Performed by: INTERNAL MEDICINE

## 2023-03-16 PROCEDURE — 93000 ELECTROCARDIOGRAM COMPLETE: CPT | Performed by: INTERNAL MEDICINE

## 2023-03-16 NOTE — PROGRESS NOTES
Reason for Visit: Hyperlipidemia.    HPI:  Marguerite Bush is a 42 y.o. female is here today as a self referral for assistance with management of hyperlipidemia.  Both her mother and father have high cholesterol.  She also has multiple family members with coronary artery disease.  She has tried Lipitor and Crestor and was intolerant to both of them with severe muscle pain.  She even tried intermittent dosing but did not get any results with this.  She is on ezetimibe currently but her recent lipid panel was uncontrolled.  In the past, her insurance has not covered nongeneric medications.  She has no current cardiac symptoms.    Previous Cardiac Testing and Procedures:  -Lipid panel (7/31/2018) total cholesterol 280, HDL 25, LDL not calculated, triglycerides > 1000  -Lipid panel (2/24/2023) total cholesterol 321, HDL 32, LDL not calculated, triglycerides 1299  -Hemoglobin A1c (2/24/2023) 6.3%  -BMP (2/24/2023) creatinine 0.6, potassium 4.2, sodium 138    Patient Active Problem List   Diagnosis   • Hyperlipidemia   • Hypertension   • Chest pain on breathing   • Statin intolerance   • Family history of premature CAD   • Controlled type 2 diabetes mellitus without complication, without long-term current use of insulin ([facility])   • Hypertriglyceridemia   • Altered bowel function   • Gastroesophageal reflux disease       Social History     Tobacco Use   • Smoking status: Former     Packs/day: 0.00     Years: 0.00     Pack years: 0.00     Types: Cigarettes   • Smokeless tobacco: Never   Vaping Use   • Vaping Use: Never used   Substance Use Topics   • Alcohol use: Not Currently     Comment: sometimes   • Drug use: No       Family History   Problem Relation Age of Onset   • Hyperlipidemia Mother    • Hypertension Mother    • Hyperlipidemia Father    • Metabolic syndrome Father    • Hypertension Father    • Diabetes Father    • Diabetes type I Maternal Aunt    • Diabetes type II Maternal Uncle    • Heart attack Maternal  Grandmother 50   • Heart disease Maternal Grandmother    • Hyperlipidemia Maternal Grandmother    • Hypertension Maternal Grandmother    • Heart failure Maternal Grandmother    • Heart disease Maternal Grandfather    • Prostate cancer Maternal Grandfather    • Hypertension Paternal Grandmother    • Heart attack Paternal Grandmother    • Liver cancer Paternal Grandmother    • Dementia Paternal Grandfather    • Colon cancer Neg Hx    • Colon polyps Neg Hx        The following portions of the patient's history were reviewed and updated as appropriate: allergies, current medications, past family history, past medical history, past social history, past surgical history and problem list.      Current Outpatient Medications:   •  albuterol sulfate  (90 Base) MCG/ACT inhaler, Inhale 2 puffs Daily., Disp: , Rfl:   •  celecoxib (CeleBREX) 100 MG capsule, Take 1 capsule by mouth Every 12 (Twelve) Hours., Disp: , Rfl:   •  citalopram (CeleXA) 10 MG tablet, Take 2 tablets by mouth Daily., Disp: , Rfl:   •  ezetimibe (ZETIA) 10 MG tablet, Take 1 tablet by mouth Daily., Disp: , Rfl:   •  gabapentin (NEURONTIN) 600 MG tablet, Take 1 tablet by mouth 3 (Three) Times a Day., Disp: , Rfl:   •  metFORMIN (GLUCOPHAGE) 500 MG tablet, Take 1 tablet by mouth 2 (Two) Times a Day With Meals., Disp: , Rfl:   •  omeprazole (priLOSEC) 20 MG capsule, Take 1 capsule by mouth 2 (Two) Times a Day., Disp: , Rfl:   •  polyethylene glycol (MIRALAX) packet, Take 17 g by mouth As Needed., Disp: , Rfl:   •  Evolocumab (REPATHA) solution prefilled syringe injection, Inject 1 mL under the skin into the appropriate area as directed Every 14 (Fourteen) Days., Disp: 2 mL, Rfl: 12  •  tiZANidine (ZANAFLEX) 4 MG tablet, Take 4 mg by mouth At Night As Needed for Muscle Spasms., Disp: , Rfl:     Review of Systems   Constitutional: Negative for chills and fever.   Cardiovascular: Negative for chest pain and paroxysmal nocturnal dyspnea.   Respiratory:  "Negative for cough and shortness of breath.    Skin: Negative for rash.   Gastrointestinal: Negative for abdominal pain and heartburn.   Neurological: Negative for dizziness and numbness.       Objective   /84 (BP Location: Left arm, Patient Position: Sitting, Cuff Size: Adult)   Pulse 63   Ht 144.8 cm (57\")   Wt 52.2 kg (115 lb)   LMP 08/27/2011 Comment: hysterectomy  SpO2 98%   BMI 24.89 kg/m²   Constitutional:       Appearance: Well-developed.   HENT:      Head: Normocephalic and atraumatic.   Pulmonary:      Effort: Pulmonary effort is normal.      Breath sounds: Normal breath sounds.   Cardiovascular:      Normal rate. Regular rhythm.      Murmurs: There is no murmur.      No gallop. No click.   Skin:     General: Skin is warm and dry.   Neurological:      Mental Status: Alert and oriented to person, place, and time.         ECG 12 Lead    Date/Time: 3/16/2023 3:38 PM  Performed by: Arnie Lorenzo MD  Authorized by: Arnie Lorenzo MD   Comparison: compared with previous ECG from 8/15/2018  Rhythm: sinus rhythm  Rate: normal    Clinical impression: normal ECG              ICD-10-CM ICD-9-CM   1. Familial hyperlipidemia  E78.49 272.4   2. Primary hypertension  I10 401.9   3. Controlled type 2 diabetes mellitus without complication, without long-term current use of insulin (HCC)  E11.9 250.00         Assessment/Plan:  1.  Familial hyperlipidemia: Fairly elevated total cholesterol and triglycerides.  Intolerant to statins (atorvastatin and rosuvastatin) and ezetimibe has not been effective.  Start Repatha.  Continue lifestyle modification.    2.  Hypertension: Currently managed on diet and exercise with good control.    3.  Diabetes mellitus type 2: Well-controlled with hemoglobin A1c of 6.3 on 3/24/2023.  Continue metformin per Dr. Luque.  "

## 2023-03-17 ENCOUNTER — PATIENT ROUNDING (BHMG ONLY) (OUTPATIENT)
Dept: CARDIOLOGY | Facility: CLINIC | Age: 43
End: 2023-03-17
Payer: COMMERCIAL

## 2023-03-17 ENCOUNTER — TELEPHONE (OUTPATIENT)
Dept: CARDIOLOGY | Facility: CLINIC | Age: 43
End: 2023-03-17
Payer: COMMERCIAL

## 2023-03-17 NOTE — PROGRESS NOTES
March 17, 2023    Hello, may I speak with Marguerite Bush?    My name is [facility], Practice Lead       I am  with [facility] HEART [facility] CARDIOLOGY [place] [facility]  [place] [address] [phone].    Before we get started may I verify your date of birth? 1980    I am calling to officially welcome you to our practice and ask about your recent visit. Is this a good time to talk? yes    Tell me about your visit with us. What things went well?  Office was fabulous        We're always looking for ways to make our patients' experiences even better. Do you have recommendations on ways we may improve?  no    Overall were you satisfied with your first visit to our practice? yes       I appreciate you taking the time to speak with me today. Is there anything else I can do for you? no      Thank you, and have a great day.

## 2023-03-21 NOTE — TELEPHONE ENCOUNTER
Plan member ID: [id number]  Case number: [id number]  Prescriber name: Arnie Lorenzo  Prescriber fax: [phone]  PRIOR AUTHORIZATION APPROVAL  Dear Marguerite Bush,  [employer]®, on behalf of [facility], is responsible for reviewing pharmacy services provided to their  members. [employer] received a request on 03/17/2023 from your prescriber for coverage of Repatha  Inj 140mg/Ml.  Your request for Repatha Inj 140mg/Ml has been approved.  How long does this approval last?  REPATHA INJ 140MG/ML, use as directed, is approved for 6 months through 09/17/2023.  Reviewed by: CLARISA montesPh.  Please note: Doses/quantities above plan limits and/or maximum Food and Drug Administration  (FDA) approved dosing may be subject to further review.  What happens when the authorization expires?  It is recommended that your prescriber contact [employer] to extend your prior authorization before it  expires so that you do not experience any disruption in therapy.

## 2023-05-01 ENCOUNTER — TELEPHONE (OUTPATIENT)
Dept: FAMILY MEDICINE CLINIC | Facility: CLINIC | Age: 43
End: 2023-05-01
Payer: COMMERCIAL

## 2023-05-01 RX ORDER — CITALOPRAM 10 MG/1
20 TABLET ORAL DAILY
Qty: 180 TABLET | Refills: 0 | Status: SHIPPED | OUTPATIENT
Start: 2023-05-01

## 2023-05-01 NOTE — TELEPHONE ENCOUNTER
Caller: Marguerite Bush    Relationship: Self    Best call back number: [phone]    Requested Prescriptions:   Requested Prescriptions     Pending Prescriptions Disp Refills   • citalopram (CeleXA) 10 MG tablet       Sig: Take 2 tablets by mouth Daily.        Pharmacy where request should be sent:  [facility]    Last office visit with prescribing clinician: 2/24/2023   Last telemedicine visit with prescribing clinician: 6/2/2023   Next office visit with prescribing clinician: 6/2/2023     Additional details provided by patient:     Does the patient have less than a 3 day supply:  [x] Yes  [] No    Would you like a call back once the refill request has been completed: [] Yes [x] No    If the office needs to give you a call back, can they leave a voicemail: [] Yes [x] No    Tricia Bernard Rep   05/01/23 13:28 CDT

## 2023-05-01 NOTE — TELEPHONE ENCOUNTER
Rx Refill Note  Requested Prescriptions     Pending Prescriptions Disp Refills   • citalopram (CeleXA) 10 MG tablet       Sig: Take 2 tablets by mouth Daily.      Last office visit with prescribing clinician: 2/24/2023   Next office visit with prescribing clinician: 6/2/2023     Carlene Noriega MA  05/01/23, 16:17 CDT

## 2023-06-06 ENCOUNTER — OFFICE VISIT (OUTPATIENT)
Dept: FAMILY MEDICINE CLINIC | Facility: CLINIC | Age: 43
End: 2023-06-06
Payer: COMMERCIAL

## 2023-06-06 VITALS
TEMPERATURE: 97.8 F | OXYGEN SATURATION: 97 % | HEART RATE: 78 BPM | WEIGHT: 114.4 LBS | HEIGHT: 57 IN | RESPIRATION RATE: 20 BRPM | BODY MASS INDEX: 24.68 KG/M2 | DIASTOLIC BLOOD PRESSURE: 78 MMHG | SYSTOLIC BLOOD PRESSURE: 121 MMHG

## 2023-06-06 DIAGNOSIS — E11.9 CONTROLLED TYPE 2 DIABETES MELLITUS WITHOUT COMPLICATION, WITHOUT LONG-TERM CURRENT USE OF INSULIN: Primary | Chronic | ICD-10-CM

## 2023-06-06 DIAGNOSIS — D50.9 IRON DEFICIENCY ANEMIA, UNSPECIFIED IRON DEFICIENCY ANEMIA TYPE: ICD-10-CM

## 2023-06-06 DIAGNOSIS — E78.01 FAMILIAL HYPERCHOLESTEROLEMIA: ICD-10-CM

## 2023-06-06 DIAGNOSIS — Z78.9 STATIN INTOLERANCE: Chronic | ICD-10-CM

## 2023-06-06 LAB
EXPIRATION DATE: ABNORMAL
HBA1C MFR BLD: 6.1 %
Lab: ABNORMAL

## 2023-06-06 NOTE — PROGRESS NOTES
Subjective cc: DM   Marguerite Bush is a 42 y.o. female.     History of Present Illness  The patient presents today for a follow-up visit. She denies any major issues from her standpoint.    The patient's blood pressure looks excellent today at 121/78 mmHg. Her heart rate is good. No fever. Oxygen level is good. Her weight has trended down a little bit compared to the spring, she is down about 5 pounds. Her weight is 114 pounds. Her BMI is at 24.    The patient's A1c in 02/2023 was 6.3 percent and were at 6.1 percent today. She states she has changed her diet again and it is not perfect, but it is a lot better than it was. The patient is taking metformin 500 mg twice a day. She states she does not need a refill on her medication yet.    The patient states she has had anemia since she was 11 years old and is currently taking an iron supplement . She states she thought after her hysterectomy, that would be gone, but apparently she is still losing blood somewhere, so that is concerning.    The patient's cholesterol levels drawn, which showed they were significantly high. She did see Dr. Hill in 03/2023 to further discuss the test results. The patient has multiple family members with heart disease. She has tried Lipitor and Crestor, both of them caused her a lot of muscle aches and pains. The patient was prescribed Zetia, which she currently takes and it works well. She states she went on a super strict diet where she did not discuss it with the nutritionist. The patient states she was mainly eating fruits, vegetables, lean meats, chicken, turkey, and maybe a steak every now and again to keep her iron level up. She states her backgrounds were only between 25 and 35 grams a day. The patient states she was eating vegetables, drinking water, and pretty much eating air. She states she started to feel like she was starving to death.     The patient states Dr. Hill started her on Repatha injectable medication, once every 2 weeks.  "She states \"it is a painful shot and she hates it\". The patient states she took her injection on Sunday and for the first 4 days, her allergies and sinuses are flared up. She states eventually as it wears off the side effects will subside, but as soon as she takes it again, it starts back up. The patient states she has an appointment with Dr. Hill later this month. She states this is the first 3 months that she has been on it. The patient states she has an order to have her blood tested again before she sees Dr. Hill to see if her cholesterol levels have stayed the same or decreased.      She states a lot of times with her changing the way she eats, she is pretty much cutting out fast food almost completely. The patient states she is eating more healthy foods, fruits, vegetables, and salads. She states the last time she did it when she went on that super strict diet, it went from 1500 to 170 in 3 months' time. The patient states she does respond quite well to diet changes. She states she wanted something that is sustainable. The patient states she got down to 96 pounds, but her other doctor told her she was too thin and small. She states she felt amazing at this point, but not so much. The patient states she is more muscle now than she was before. She states she walks 3 miles on Wednesday mornings almost every week. The patient states with her job, she is on her feet constantly. 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 The patient states she gets a physical upper body workout every 3 weeks.  Diabetes  Pertinent negatives for hypoglycemia include no dizziness or headaches. Pertinent negatives for diabetes include no chest pain, no fatigue and no weakness.        The following portions of the patient's history were reviewed and updated as appropriate: allergies, current medications, past family history, past medical history, past social history, past surgical history, and problem " "list.        Review of Systems   Constitutional: Negative.  Negative for fatigue and fever.   HENT: Negative.  Negative for ear pain and sore throat.    Eyes: Negative.  Negative for pain and visual disturbance.   Respiratory: Negative.  Negative for cough and shortness of breath.    Cardiovascular: Negative.  Negative for chest pain and palpitations.   Gastrointestinal:  Negative for abdominal pain and nausea.   Endocrine: Negative.    Genitourinary:  Negative for dysuria.   Musculoskeletal:  Negative for arthralgias.   Skin:  Negative for rash.   Allergic/Immunologic: Negative.    Neurological:  Negative for dizziness, weakness and headaches.   Psychiatric/Behavioral:  Negative for sleep disturbance.      Objective   Blood pressure 121/78, pulse 78, temperature 97.8 °F (36.6 °C), temperature source Infrared, resp. rate 20, height 144.8 cm (57\"), weight 51.9 kg (114 lb 6.4 oz), last menstrual period 08/27/2011, SpO2 97 %, not currently breastfeeding.  Physical Exam  Vitals and nursing note reviewed.   Constitutional:       General: She is not in acute distress.     Appearance: She is well-developed. She is not diaphoretic.   HENT:      Head: Normocephalic and atraumatic.      Right Ear: External ear normal.      Left Ear: External ear normal.      Nose: Nose normal.   Eyes:      General:         Right eye: No discharge.         Left eye: No discharge.      Conjunctiva/sclera: Conjunctivae normal.   Neck:      Thyroid: No thyromegaly.      Trachea: No tracheal deviation.   Cardiovascular:      Rate and Rhythm: Normal rate and regular rhythm.      Heart sounds: Normal heart sounds.   Pulmonary:      Effort: Pulmonary effort is normal. No respiratory distress.      Breath sounds: Normal breath sounds. No stridor. No wheezing.   Chest:      Chest wall: No tenderness.   Abdominal:      General: Bowel sounds are normal. There is no distension.      Palpations: Abdomen is soft.      Tenderness: There is no abdominal " tenderness.   Musculoskeletal:         General: Normal range of motion.      Cervical back: Normal range of motion.   Lymphadenopathy:      Cervical: No cervical adenopathy.   Skin:     General: Skin is warm and dry.   Neurological:      Mental Status: She is alert and oriented to person, place, and time.      Motor: No abnormal muscle tone.      Coordination: Coordination normal.   Psychiatric:         Behavior: Behavior normal.         Thought Content: Thought content normal.         Judgment: Judgment normal.       Assessment & Plan   Problems Addressed this Visit          Allergies and Adverse Reactions    Statin intolerance (Chronic)       Cardiac and Vasculature    Hyperlipidemia       Endocrine and Metabolic    Controlled type 2 diabetes mellitus without complication, without long-term current use of insulin - Primary (Chronic)    Relevant Orders    POC Glycosylated Hemoglobin (Hb A1C) (Completed)    Microalbumin / Creatinine Urine Ratio - Urine, Clean Catch     Other Visit Diagnoses       Iron deficiency anemia, unspecified iron deficiency anemia type        Relevant Orders    Iron and TIBC    CBC No Differential          Diagnoses         Codes Comments    Controlled type 2 diabetes mellitus without complication, without long-term current use of insulin    -  Primary ICD-10-CM: E11.9  ICD-9-CM: 250.00     Statin intolerance     ICD-10-CM: Z78.9  ICD-9-CM: 995.27     Familial hypercholesterolemia     ICD-10-CM: E78.01  ICD-9-CM: 272.0     Iron deficiency anemia, unspecified iron deficiency anemia type     ICD-10-CM: D50.9  ICD-9-CM: 280.9           1. Hypertension: Chronic, controlled.  - Continue with current medication.    2. Hyperlipidemia: Chronic, uncontrolled.  - Discussed dietary changes as well as use of Repatha.  - Discussed that there is a newer medication that is once every 6 months as opposed to once every 2 weeks.  - Patient can discuss with cardiology if that would be a reasonable alternative  or if she should continue on the Repatha.    3. Diabetes mellitus: Chronic, controlled.  - Reviewed hemoglobin A1c.  - We will continue with current treatment plan of metformin.    4. Iron deficiency anemia: Chronic.  - Patient has been taking an iron supplement daily.  - We will recheck CBC and iron profile for further evaluation.       Answers submitted by the patient for this visit:  Other (Submitted on 5/30/2023)  Please describe your symptoms.: Three month checkup  Have you had these symptoms before?: No  How long have you been having these symptoms?: Greater than 2 weeks  Primary Reason for Visit (Submitted on 5/30/2023)  What is the primary reason for your visit?: Other      Transcribed from ambient dictation for Agustina Luque MD by Lali Jc.  06/06/23   11:40 CDT    Patient or patient representative verbalized consent to the visit recording.  I have personally performed the services described in this document as transcribed by the above individual, and it is both accurate and complete.          This document has been electronically signed by Agustina Luque MD on June 9, 2023 13:21 CDT

## 2023-06-07 LAB
ALBUMIN/CREAT UR: 5 MG/G CREAT (ref 0–29)
CHOLEST SERPL-MCNC: 118 MG/DL (ref 0–200)
CREAT UR-MCNC: 78.1 MG/DL
ERYTHROCYTE [DISTWIDTH] IN BLOOD BY AUTOMATED COUNT: 14.5 % (ref 12.3–15.4)
HCT VFR BLD AUTO: 37.2 % (ref 34–46.6)
HDLC SERPL-MCNC: 47 MG/DL (ref 40–60)
HGB BLD-MCNC: 12.1 G/DL (ref 12–15.9)
IRON SATN MFR SERPL: 19 % (ref 20–50)
IRON SERPL-MCNC: 101 MCG/DL (ref 37–145)
LDLC SERPL CALC-MCNC: 39 MG/DL (ref 0–100)
MCH RBC QN AUTO: 27.3 PG (ref 26.6–33)
MCHC RBC AUTO-ENTMCNC: 32.5 G/DL (ref 31.5–35.7)
MCV RBC AUTO: 83.8 FL (ref 79–97)
MICROALBUMIN UR-MCNC: 4 UG/ML
PLATELET # BLD AUTO: 304 10*3/MM3 (ref 140–450)
RBC # BLD AUTO: 4.44 10*6/MM3 (ref 3.77–5.28)
TIBC SERPL-MCNC: 520 MCG/DL
TRIGL SERPL-MCNC: 197 MG/DL (ref 0–150)
UIBC SERPL-MCNC: 419 MCG/DL (ref 112–346)
VLDLC SERPL CALC-MCNC: 32 MG/DL (ref 5–40)
WBC # BLD AUTO: 5.1 10*3/MM3 (ref 3.4–10.8)

## 2023-06-14 DIAGNOSIS — E78.49 OTHER HYPERLIPIDEMIA: Primary | ICD-10-CM

## 2023-06-21 PROBLEM — R07.1 CHEST PAIN ON BREATHING: Status: RESOLVED | Noted: 2018-08-14 | Resolved: 2023-06-21

## 2023-06-21 PROBLEM — E78.49 FAMILIAL HYPERLIPIDEMIA: Status: ACTIVE | Noted: 2018-08-14

## 2023-07-24 RX ORDER — CITALOPRAM HYDROBROMIDE 10 MG/1
TABLET ORAL
Qty: 180 TABLET | Refills: 0 | Status: SHIPPED | OUTPATIENT
Start: 2023-07-24

## 2023-07-24 NOTE — TELEPHONE ENCOUNTER
Rx Refill Note  Requested Prescriptions     Pending Prescriptions Disp Refills    citalopram (CeleXA) 10 MG tablet [Pharmacy Med Name: Citalopram Hydrobromide 10 MG Oral Tablet] 180 tablet 0     Sig: Take 2 tablets by mouth once daily      Last office visit with prescribing clinician: 6/6/2023   Last telemedicine visit with prescribing clinician: Visit date not found   Next office visit with prescribing clinician: Visit date not found                         Would you like a call back once the refill request has been completed: [] Yes [] No    If the office needs to give you a call back, can they leave a voicemail: [] Yes [] No    Marilin Singletary LPN  07/24/23, 09:16 CDT

## 2023-07-28 RX ORDER — ALBUTEROL SULFATE 90 UG/1
2 AEROSOL, METERED RESPIRATORY (INHALATION) DAILY
Qty: 18 G | Refills: 0 | Status: SHIPPED | OUTPATIENT
Start: 2023-07-28

## 2023-07-28 RX ORDER — CITALOPRAM HYDROBROMIDE 10 MG/1
20 TABLET ORAL DAILY
Qty: 180 TABLET | Refills: 0 | OUTPATIENT
Start: 2023-07-28

## 2023-08-21 ENCOUNTER — TELEPHONE (OUTPATIENT)
Dept: CARDIOLOGY | Facility: CLINIC | Age: 43
End: 2023-08-21
Payer: COMMERCIAL

## 2023-08-22 NOTE — TELEPHONE ENCOUNTER
? Your request has been approved  Request Reference Number: [id number]. REPATHA INJ 140MG/ML is approved through 08/21/2024. Your patient may now fill this prescription and it will be covered.    File

## 2023-09-11 ENCOUNTER — TELEPHONE (OUTPATIENT)
Dept: CARDIOLOGY | Facility: CLINIC | Age: 43
End: 2023-09-11
Payer: COMMERCIAL

## 2023-09-11 RX ORDER — LISINOPRIL 10 MG/1
10 TABLET ORAL DAILY
Qty: 30 TABLET | Refills: 11 | Status: SHIPPED | OUTPATIENT
Start: 2023-09-11

## 2023-09-11 NOTE — TELEPHONE ENCOUNTER
Patient called to advise that BP has been running high for the last week. [facility] gave her Clonidine 0.1mg to take as needed . She stated she use to take Lisinopril before she lost weight. Only symptoms are headaches.   /95 today         145/95 yesterday          165/110  at fast pace  that made her wait til BP was down and got down to 125/85

## 2023-10-23 NOTE — TELEPHONE ENCOUNTER
Rx Refill Note  Requested Prescriptions     Pending Prescriptions Disp Refills    citalopram (CeleXA) 10 MG tablet [Pharmacy Med Name: Citalopram Hydrobromide 10 MG Oral Tablet] 180 tablet 0     Sig: Take 2 tablets by mouth once daily     Zuleima Best MA  10/23/23, 10:22 CDT

## 2023-10-25 RX ORDER — CITALOPRAM HYDROBROMIDE 10 MG/1
TABLET ORAL
Qty: 180 TABLET | Refills: 0 | Status: SHIPPED | OUTPATIENT
Start: 2023-10-25

## 2023-10-25 RX ORDER — CITALOPRAM HYDROBROMIDE 10 MG/1
20 TABLET ORAL DAILY
Qty: 180 TABLET | Refills: 0 | OUTPATIENT
Start: 2023-10-25

## 2023-10-25 NOTE — TELEPHONE ENCOUNTER
Caller: Marguerite Bush    Relationship: Self    Best call back number: [phone]     Requested Prescriptions:   Requested Prescriptions     Pending Prescriptions Disp Refills    citalopram (CeleXA) 10 MG tablet 180 tablet 0     Sig: Take 2 tablets by mouth Daily.        Pharmacy where request should be sent: [facility] [address] [place] [address] [phone] [facility] [phone]     Last office visit with prescribing clinician: 6/6/2023   Last telemedicine visit with prescribing clinician: Visit date not found   Next office visit with prescribing clinician: Visit date not found     Additional details provided by patient: [place] THIS AFTERNOON, ONLY HAS 3 DAYS LEFT, WILL BE GONE 7 DAYS    Does the patient have less than a 3 day supply:  [x] Yes  [] No    Would you like a call back once the refill request has been completed: [x] Yes [] No      Tricia Claire Rep   10/25/23 11:23 CDT

## 2023-11-20 ENCOUNTER — OFFICE VISIT (OUTPATIENT)
Dept: FAMILY MEDICINE CLINIC | Facility: CLINIC | Age: 43
End: 2023-11-20
Payer: COMMERCIAL

## 2023-11-20 VITALS
OXYGEN SATURATION: 98 % | BODY MASS INDEX: 24.8 KG/M2 | WEIGHT: 123 LBS | SYSTOLIC BLOOD PRESSURE: 116 MMHG | HEART RATE: 91 BPM | DIASTOLIC BLOOD PRESSURE: 77 MMHG | HEIGHT: 59 IN | TEMPERATURE: 98.2 F | RESPIRATION RATE: 20 BRPM

## 2023-11-20 DIAGNOSIS — H65.93 BILATERAL OTITIS MEDIA WITH EFFUSION: Primary | ICD-10-CM

## 2023-11-20 DIAGNOSIS — J32.9 RHINOSINUSITIS: ICD-10-CM

## 2023-11-20 PROCEDURE — 99213 OFFICE O/P EST LOW 20 MIN: CPT | Performed by: NURSE PRACTITIONER

## 2023-11-20 RX ORDER — AZITHROMYCIN 250 MG/1
TABLET, FILM COATED ORAL
Qty: 6 TABLET | Refills: 0 | Status: SHIPPED | OUTPATIENT
Start: 2023-11-20

## 2023-11-20 NOTE — PROGRESS NOTES
"Chief Complaint  Adenopathy (Mrs. Bush is here for a swollen lymph nodes.)    Subjective        Marguerite Bush presents to [facility]  Adenopathy      Here for acute visit  Reports ear issues for about 1 week  Reports vertigo flare up which she has been using meclizine which has not helped much with ear issues  Reports ears bilaterally are causing swelling and tenderness in neck lymph nodes  Causing head to throat and sinus pressure/pain  Reports this has been an issue before about a year ago, when weather changes.       Objective   Vital Signs:  /77 (BP Location: Left arm, Patient Position: Sitting, Cuff Size: Adult)   Pulse 91   Temp 98.2 °F (36.8 °C)   Resp 20   Ht 149.9 cm (59.02\")   Wt 55.8 kg (123 lb)   SpO2 98%   BMI 24.83 kg/m²   Estimated body mass index is 24.83 kg/m² as calculated from the following:    Height as of this encounter: 149.9 cm (59.02\").    Weight as of this encounter: 55.8 kg (123 lb).       BMI is within normal parameters. No other follow-up for BMI required.      Physical Exam  Vitals and nursing note reviewed.   Constitutional:       General: She is not in acute distress.     Appearance: She is well-developed.   HENT:      Head: Normocephalic and atraumatic.      Right Ear: Tympanic membrane and ear canal normal. There is impacted cerumen. Tympanic membrane is erythematous.      Left Ear: Tympanic membrane and ear canal normal. There is impacted cerumen. Tympanic membrane is erythematous.      Nose: Nose normal.      Right Sinus: No maxillary sinus tenderness or frontal sinus tenderness.      Left Sinus: No maxillary sinus tenderness or frontal sinus tenderness.      Mouth/Throat:      Mouth: Mucous membranes are moist.      Pharynx: Oropharynx is clear. Uvula midline. Oropharyngeal exudate (pnd) present. No uvula swelling.   Eyes:      Conjunctiva/sclera: Conjunctivae normal.   Neck:      Thyroid: No thyromegaly.      Trachea: No tracheal " deviation.   Cardiovascular:      Rate and Rhythm: Normal rate and regular rhythm.      Heart sounds: Normal heart sounds.   Pulmonary:      Effort: Pulmonary effort is normal.      Breath sounds: Normal breath sounds.   Musculoskeletal:      Cervical back: Neck supple.   Lymphadenopathy:      Cervical: No cervical adenopathy.      Right cervical: Superficial cervical adenopathy present.      Left cervical: Superficial cervical adenopathy present.   Skin:     General: Skin is warm and dry.   Neurological:      Mental Status: She is alert.   Psychiatric:         Behavior: Behavior normal.        Result Review :                   Assessment and Plan   Diagnoses and all orders for this visit:    1. Bilateral otitis media with effusion (Primary)  -     azithromycin (Zithromax Z-Ambrose) 250 MG tablet; Take 2 tablets by mouth on day 1, then 1 tablet daily on days 2-5  Dispense: 6 tablet; Refill: 0    2. Rhinosinusitis  -     azithromycin (Zithromax Z-Ambrose) 250 MG tablet; Take 2 tablets by mouth on day 1, then 1 tablet daily on days 2-5  Dispense: 6 tablet; Refill: 0      Continue supportive treatment          Follow Up   Return in about 1 week (around 11/27/2023), or if symptoms worsen or fail to improve.  Patient was given instructions and counseling regarding her condition or for health maintenance advice. Please see specific information pulled into the AVS if appropriate.         Answers submitted by the patient for this visit:  Primary Reason for Visit (Submitted on 11/19/2023)  What is the primary reason for your visit?: Ear Pain

## 2023-12-06 ENCOUNTER — HOSPITAL ENCOUNTER (OUTPATIENT)
Dept: PAIN MANAGEMENT | Age: 43
Discharge: HOME | End: 2023-12-06
Payer: COMMERCIAL

## 2023-12-06 VITALS
RESPIRATION RATE: 2 BRPM | HEIGHT: 58 IN | TEMPERATURE: 97.9 F | WEIGHT: 126 LBS | HEART RATE: 90 BPM | OXYGEN SATURATION: 97 % | BODY MASS INDEX: 26.45 KG/M2

## 2023-12-06 DIAGNOSIS — M54.2 CERVICALGIA: ICD-10-CM

## 2023-12-06 DIAGNOSIS — M54.12 CERVICAL RADICULOPATHY: ICD-10-CM

## 2023-12-06 DIAGNOSIS — M79.18 MYOFASCIAL PAIN: Primary | ICD-10-CM

## 2023-12-06 PROCEDURE — 99215 OFFICE O/P EST HI 40 MIN: CPT

## 2023-12-06 RX ORDER — LORATADINE 10 MG/1
10 TABLET ORAL DAILY
COMMUNITY

## 2023-12-06 RX ORDER — TIZANIDINE 4 MG/1
4 TABLET ORAL 3 TIMES DAILY
COMMUNITY
End: 2023-12-06 | Stop reason: ALTCHOICE

## 2023-12-06 RX ORDER — EZETIMIBE 10 MG/1
10 TABLET ORAL DAILY
COMMUNITY

## 2023-12-06 RX ORDER — CELECOXIB 100 MG/1
100 CAPSULE ORAL 2 TIMES DAILY
Qty: 60 CAPSULE | Refills: 2 | Status: SHIPPED | OUTPATIENT
Start: 2023-12-06

## 2023-12-06 RX ORDER — CELECOXIB 100 MG/1
100 CAPSULE ORAL 2 TIMES DAILY
COMMUNITY
End: 2023-12-06 | Stop reason: SDUPTHER

## 2023-12-06 RX ORDER — ESOMEPRAZOLE MAGNESIUM 40 MG/1
40 GRANULE, DELAYED RELEASE ORAL DAILY
COMMUNITY

## 2023-12-06 RX ORDER — EVOLOCUMAB 140 MG/ML
INJECTION, SOLUTION SUBCUTANEOUS
COMMUNITY

## 2023-12-06 RX ORDER — GABAPENTIN 600 MG/1
600 TABLET ORAL 3 TIMES DAILY
COMMUNITY

## 2023-12-06 RX ORDER — CITALOPRAM HYDROBROMIDE 10 MG/1
10 TABLET ORAL 2 TIMES DAILY
COMMUNITY

## 2023-12-06 RX ORDER — CYCLOBENZAPRINE HCL 10 MG
10 TABLET ORAL NIGHTLY PRN
Qty: 30 TABLET | Refills: 2 | Status: SHIPPED | OUTPATIENT
Start: 2023-12-06 | End: 2024-03-05

## 2023-12-06 RX ORDER — METFORMIN HYDROCHLORIDE 500 MG/1
500 TABLET, EXTENDED RELEASE ORAL 2 TIMES DAILY
COMMUNITY

## 2023-12-06 ASSESSMENT — PAIN DESCRIPTION - ORIENTATION: ORIENTATION: RIGHT;LEFT;MID

## 2023-12-06 ASSESSMENT — PAIN DESCRIPTION - LOCATION: LOCATION: NECK

## 2023-12-06 ASSESSMENT — PAIN SCALES - GENERAL: PAINLEVEL_OUTOF10: 8

## 2023-12-06 ASSESSMENT — PAIN DESCRIPTION - PAIN TYPE: TYPE: CHRONIC PAIN

## 2023-12-06 NOTE — H&P
325 (65 FE) MG TABS Take 3 tablets by mouth daily. B Complex Vitamins (VITAMIN B COMPLEX PO) Take  by mouth. 1 gtt daily       oxycodone-acetaminophen (PERCOCET) 5-325 MG per tablet Take 1 tablet by mouth every 4 hours as needed. Ascorbic Acid (VITAMIN C) 500 MG tablet Take 500 mg by mouth daily. lisinopril (PRINIVIL;ZESTRIL) 10 MG tablet Take 10 mg by mouth daily. hydrocodone-acetaminophen (LORTAB) 7.5-500 MG per tablet Take 1 tablet by mouth every 6 hours as needed. No current facility-administered medications for this encounter. Social History    Social History     Socioeconomic History    Marital status:    Tobacco Use    Smoking status: Former    Tobacco comments:     smoked for one month when was younger   Substance and Sexual Activity    Alcohol use: Yes     Comment: occ         Family History  family history includes High Blood Pressure in her father and mother. Review of Systems:  Review of Systems     14 point ROS negative besides that noted in HPI    Physical exam:     There were no vitals filed for this visit. There is no height or weight on file to calculate BMI. Physical Exam    Labs:     Lab Results   Component Value Date/Time     08/02/2011 02:01 PM    K 4.3 08/02/2011 02:01 PM     08/02/2011 02:01 PM    CO2 30 08/02/2011 02:01 PM    BUN 12 08/02/2011 02:01 PM    CREATININE 0.6 08/02/2011 02:01 PM    GLUCOSE 93 08/02/2011 02:01 PM    CALCIUM 9.9 08/02/2011 02:01 PM        Lab Results   Component Value Date    WBC 5.87 08/02/2011    HGB 13.0 08/02/2011    HCT 41.2 08/02/2011    MCV 89.0 08/02/2011     08/02/2011       Assessment:                                                                                                                                        Active Problems:    * No active hospital problems. *  Resolved Problems:    * No resolved hospital problems.  *      PLAN:    There are no diagnoses linked to this

## 2023-12-06 NOTE — PROGRESS NOTES
Clinic Documentation      Education Provided:  [x] Review of Letitia Escobar  [x] Agreement Review  [x] PEG Score Calculated [x] PHQ Score Calculated [x] ORT Score Calculated    [] Compliance Issues Discussed [] Cognitive Behavior Needs [x] Exercise [] Review of Test [] Financial Issues  [x] Tobacco/Alcohol Use Reviewed [x] Teaching [x] New Patient [] Picture Obtained    Physician Plan:  [] Outgoing Referral  [] Pharmacy Consult  [] Test Ordered [x] Prescription Ordered/Changed   [] Obtained Test Results / Consult Notes        Complete if patient is withholding blood thinner for procedure     Blood Thinner Patient is currently taking:      [] Plavix (Hold for 7 days)  [] Aspirin (Hold for 5 days)     [] Pletal (Hold for 2 days)  [] Pradaxa (Hold for 3 days)    [] Effient (Hold for 7 days)  [] Xarelto (Hold for 2 days)    [] Eliquis (Hold for 2 days)  [] Brilinta (Hold for 7 days)    [] Coumadin (Hold for 5 days) - (INR needs to be drawn the day prior to procedure- INR < 2.0)    [] Aggrenox (Hold for 7 days)        [] Patient will stop medication on their own.    [] Blood Thinner Form Faxed for approval to hold.    Provider form faxed to:     Assessment Completed by:  Electronically signed by Enedelia Parham MA on 12/6/2023 at 3:07 PM        Clinic Documentation      Education Provided:  [x] Review of Manan  [] Agreement Review  [x] PEG Score Calculated [] PHQ Score Calculated [] ORT Score Calculated    [] Compliance Issues Discussed [] Cognitive Behavior Needs [x] Exercise [] Review of Test [] Financial Issues  [x] Tobacco/Alcohol Use Reviewed [x] Teaching [] New Patient [] Picture Obtained    Physician Plan:  [] Outgoing Referral  [] Pharmacy Consult  [] Test Ordered [] Prescription Ordered/Changed   [] Obtained Test Results / Consult Notes        Complete if patient is withholding blood thinner for procedure     Blood Thinner Patient is currently taking:      [] Plavix (Hold for 7 days)  [] Aspirin (Hold for 5 days)

## 2023-12-07 NOTE — TELEPHONE ENCOUNTER
1. Myofascial pain    2. Cervicalgia    3.  Cervical radiculopathy      Requested Prescriptions     Pending Prescriptions Disp Refills    cyclobenzaprine (FLEXERIL) 10 MG tablet 30 tablet 2     Sig: Take 1 tablet by mouth nightly as needed for Muscle spasms    celecoxib (CELEBREX) 100 MG capsule 60 capsule 2     Sig: Take 1 capsule by mouth 2 times daily       Continue medication with refill sent at appointment yes; refill sent to medical director at appointment no, see refill encounter dated 12/6/2023    Electronically signed by Marisol Ballard PA-C on 12/6/2023 at 6:50 PM  2

## 2023-12-12 NOTE — TELEPHONE ENCOUNTER
Rx Refill Note  Requested Prescriptions     Pending Prescriptions Disp Refills    ezetimibe (ZETIA) 10 MG tablet       Sig: Take 1 tablet by mouth Daily.      Last office visit with prescribing clinician: 6/6/2023   Next office visit with prescribing clinician: Visit date not found       Carlene Noriega CMA  12/12/23, 14:44 CST

## 2023-12-13 RX ORDER — EZETIMIBE 10 MG/1
10 TABLET ORAL DAILY
Qty: 90 TABLET | Refills: 1 | Status: SHIPPED | OUTPATIENT
Start: 2023-12-13

## 2024-01-10 NOTE — TELEPHONE ENCOUNTER
Rx Refill Note  Requested Prescriptions     Pending Prescriptions Disp Refills    metFORMIN (GLUCOPHAGE) 500 MG tablet       Sig: Take 1 tablet by mouth 2 (Two) Times a Day With Meals.    albuterol sulfate  (90 Base) MCG/ACT inhaler 18 g 0     Sig: Inhale 2 puffs Daily.      Last office visit with prescribing clinician: 6/6/2023   Last telemedicine visit with prescribing clinician: Visit date not found   Next office visit with prescribing clinician: Visit date not found                         Would you like a call back once the refill request has been completed: [] Yes [] No    If the office needs to give you a call back, can they leave a voicemail: [] Yes [] No    Mishel Wiggins, PCT  01/10/24, 16:19 CST

## 2024-01-11 RX ORDER — ALBUTEROL SULFATE 90 UG/1
2 AEROSOL, METERED RESPIRATORY (INHALATION) DAILY
Qty: 18 G | Refills: 0 | Status: SHIPPED | OUTPATIENT
Start: 2024-01-11

## 2024-01-15 PROBLEM — M79.18 MYOFASCIAL PAIN: Status: ACTIVE | Noted: 2024-01-15

## 2024-01-15 PROBLEM — M54.2 CERVICALGIA: Status: ACTIVE | Noted: 2024-01-15

## 2024-01-15 PROBLEM — F11.90 CHRONIC, CONTINUOUS USE OF OPIOIDS: Status: ACTIVE | Noted: 2024-01-15

## 2024-01-15 PROBLEM — M50.90 CERVICAL DISC DISORDER: Status: ACTIVE | Noted: 2024-01-15

## 2024-01-15 PROBLEM — M50.30 DEGENERATIVE DISC DISEASE, CERVICAL: Status: ACTIVE | Noted: 2024-01-15

## 2024-01-15 PROBLEM — M54.12 CERVICAL RADICULOPATHY: Status: ACTIVE | Noted: 2024-01-15

## 2024-01-15 ASSESSMENT — ENCOUNTER SYMPTOMS
BACK PAIN: 0
CONSTIPATION: 0

## 2024-01-18 RX ORDER — CITALOPRAM HYDROBROMIDE 10 MG/1
TABLET ORAL
Qty: 180 TABLET | Refills: 0 | Status: SHIPPED | OUTPATIENT
Start: 2024-01-18

## 2024-01-18 NOTE — TELEPHONE ENCOUNTER
Patient scheduled.   Rx Refill Note  Requested Prescriptions     Pending Prescriptions Disp Refills    citalopram (CeleXA) 10 MG tablet [Pharmacy Med Name: Citalopram Hydrobromide 10 MG Oral Tablet] 180 tablet 0     Sig: Take 2 tablets by mouth once daily      Last office visit with prescribing clinician: 6/6/2023   Last telemedicine visit with prescribing clinician: Visit date not found   Next office visit with prescribing clinician: Visit date not found                         Would you like a call back once the refill request has been completed: [] Yes [] No    If the office needs to give you a call back, can they leave a voicemail: [] Yes [] No    Marilin Singletary LPN  01/18/24, 12:49 CST

## 2024-01-24 ENCOUNTER — OFFICE VISIT (OUTPATIENT)
Dept: FAMILY MEDICINE CLINIC | Facility: CLINIC | Age: 44
End: 2024-01-24
Payer: COMMERCIAL

## 2024-01-24 VITALS
SYSTOLIC BLOOD PRESSURE: 130 MMHG | WEIGHT: 123 LBS | BODY MASS INDEX: 25.82 KG/M2 | HEIGHT: 58 IN | HEART RATE: 79 BPM | DIASTOLIC BLOOD PRESSURE: 88 MMHG | TEMPERATURE: 97.9 F | OXYGEN SATURATION: 97 % | RESPIRATION RATE: 16 BRPM

## 2024-01-24 DIAGNOSIS — E11.9 CONTROLLED TYPE 2 DIABETES MELLITUS WITHOUT COMPLICATION, WITHOUT LONG-TERM CURRENT USE OF INSULIN: Primary | ICD-10-CM

## 2024-01-24 DIAGNOSIS — E78.2 MIXED HYPERLIPIDEMIA: ICD-10-CM

## 2024-01-24 DIAGNOSIS — F32.A ANXIETY AND DEPRESSION: ICD-10-CM

## 2024-01-24 DIAGNOSIS — Z11.59 NEED FOR HEPATITIS C SCREENING TEST: ICD-10-CM

## 2024-01-24 DIAGNOSIS — I10 PRIMARY HYPERTENSION: ICD-10-CM

## 2024-01-24 DIAGNOSIS — J06.9 UPPER RESPIRATORY TRACT INFECTION, UNSPECIFIED TYPE: ICD-10-CM

## 2024-01-24 DIAGNOSIS — Z23 NEED FOR TDAP VACCINATION: ICD-10-CM

## 2024-01-24 DIAGNOSIS — E78.01 FAMILIAL HYPERCHOLESTEROLEMIA: ICD-10-CM

## 2024-01-24 DIAGNOSIS — F41.9 ANXIETY AND DEPRESSION: ICD-10-CM

## 2024-01-24 DIAGNOSIS — J32.9 RHINOSINUSITIS: ICD-10-CM

## 2024-01-24 DIAGNOSIS — D50.9 IRON DEFICIENCY ANEMIA, UNSPECIFIED IRON DEFICIENCY ANEMIA TYPE: ICD-10-CM

## 2024-01-24 DIAGNOSIS — Z78.9 STATIN INTOLERANCE: ICD-10-CM

## 2024-01-24 DIAGNOSIS — M25.50 POLYARTHRALGIA: ICD-10-CM

## 2024-01-24 RX ORDER — BENZONATATE 100 MG/1
100 CAPSULE ORAL 3 TIMES DAILY PRN
Qty: 30 CAPSULE | Refills: 0 | Status: SHIPPED | OUTPATIENT
Start: 2024-01-24

## 2024-01-24 RX ORDER — AZITHROMYCIN 250 MG/1
TABLET, FILM COATED ORAL
Qty: 6 TABLET | Refills: 0 | Status: SHIPPED | OUTPATIENT
Start: 2024-01-24

## 2024-01-24 RX ORDER — ALBUTEROL SULFATE 90 UG/1
2 AEROSOL, METERED RESPIRATORY (INHALATION) DAILY
Qty: 18 G | Refills: 2 | Status: SHIPPED | OUTPATIENT
Start: 2024-01-24

## 2024-01-24 NOTE — LETTER
[facility]  Vaccine Consent Form    Patient Name:  Marguerite Bush  Patient :  1980     Vaccine(s) Ordered    Tdap Vaccine => 6yo IM (BOOSTRIX)        Screening Checklist  The following questions should be completed prior to vaccination. If you answer “yes” to any question, it does not necessarily mean you should not be vaccinated. It just means we may need to clarify or ask more questions. If a question is unclear, please ask your healthcare provider to explain it.    Yes No   Any fever or moderate to severe illness today (mild illness and/or antibiotic treatment are not contraindications)?     Do you have a history of a serious reaction to any previous vaccinations, such as anaphylaxis, encephalopathy within 7 days, Guillain-[place] syndrome within 6 weeks, seizure?     Have you received any live vaccine(s) (e.g MMR, RAYMON) or any other vaccines in the last month (to ensure duplicate doses aren't given)?     Do you have an anaphylactic allergy to latex (DTaP, DTaP-IPV, Hep A, Hep B, MenB, RV, Td, Tdap), baker’s yeast (Hep B, HPV), polysorbates (RSV, nirsevimab, PCV 20, Rotavirrus, Tdap, Shingrix), or gelatin (RAYMON, MMR)?     Do you have an anaphylactic allergy to neomycin (Rabies, RAYMON, MMR, IPV, Hep A), polymyxin B (IPV), or streptomycin (IPV)?      Any cancer, leukemia, AIDS, or other immune system disorder? (RAYMON, MMR, RV)     Do you have a parent, brother, or sister with an immune system problem (if immune competence of vaccine recipient clinically verified, can proceed)? (MMR, RAYMON)     Any recent steroid treatments for >2 weeks, chemotherapy, or radiation treatment? (RAYMON, MMR)     Have you received antibody-containing blood transfusions or IVIG in the past 11 months (recommended interval is dependent on product)? (MMR, RAYMON)     Have you taken antiviral drugs (acyclovir, famciclovir, valacyclovir for RAYMON) in the last 24 or 48 hours, respectively?      Are you pregnant or planning to become pregnant within  "1 month? (RAYMON, MMR, HPV, IPV, MenB, Abrexvy; For Hep B- refer to Engerix-B; For RSV - Abrysvo is indicated for 32-36 weeks of pregnancy from September to January)     For infants, have you ever been told your child has had intussusception or a medical emergency involving obstruction of the intestine (Rotavirus)? If not for an infant, can skip this question.         *Ordering Physicians/APC should be consulted if \"yes\" is checked by the patient or guardian above.  I have received, read, and understand the Vaccine Information Statement (VIS) for each vaccine ordered.  I have considered my or my child's health status as well as the health status of my close contacts.  I have taken the opportunity to discuss my vaccine questions with my or my child's health care provider.   I have requested that the ordered vaccine(s) be given to me or my child.  I understand the benefits and risks of the vaccines.  I understand that I should remain in the clinic for 15 minutes after receiving the vaccine(s).  _________________________________________________________  Signature of Patient or Parent/Legal Guardian ____________________  Date     "

## 2024-01-24 NOTE — PROGRESS NOTES
"Chief Complaint  Cough (Patient here for cough. ) and Diabetes    Subjective        Marguerite Bush presents to [facility]  History of Present Illness  Here for 6 month chronic follow up visit  Reports diabetes stable with metformin, reports may be high A1c due to holidays and sweets  Gerd stable nexium   Celebrex, zanaflex and gabapentin for neck and back, gets from a different provider  Lisinipril keeping bp stable, nephroprotective  Zetia for hyperlipidemia, repatha- concerned for cost now $155 month, cannot tolerate statins  Celexa help with anxiety and depression  Planning on doing dm eye exam this year before moving  No pap smear needed d/t full hysterectomy- get occasional pelvic exam- does not plan on doing before leave or will get at health dept  Plans on getting mammogram   Reports sinus congestion/issues and cough for a few weeks, some wheezing. Has lingered    Objective   Vital Signs:  /88 (BP Location: Left arm, Patient Position: Sitting, Cuff Size: Large Adult)   Pulse 79   Temp 97.9 °F (36.6 °C) (Infrared)   Resp 16   Ht 147.3 cm (58\")   Wt 55.8 kg (123 lb)   SpO2 97%   BMI 25.71 kg/m²   Estimated body mass index is 25.71 kg/m² as calculated from the following:    Height as of this encounter: 147.3 cm (58\").    Weight as of this encounter: 55.8 kg (123 lb).       BMI is >= 25 and <30. (Overweight) The following options were offered after discussion;: exercise counseling/recommendations and nutrition counseling/recommendations      Physical Exam  Vitals and nursing note reviewed.   Constitutional:       General: She is not in acute distress.     Appearance: She is well-developed and overweight.   HENT:      Head: Normocephalic and atraumatic.      Right Ear: Tympanic membrane and ear canal normal. A middle ear effusion is present.      Left Ear: Tympanic membrane and ear canal normal. A middle ear effusion is present.      Nose: Nose normal. Congestion " present.      Right Sinus: Maxillary sinus tenderness present. No frontal sinus tenderness.      Left Sinus: Maxillary sinus tenderness present. No frontal sinus tenderness.      Mouth/Throat:      Mouth: Mucous membranes are moist.      Pharynx: Oropharynx is clear. Uvula midline. No uvula swelling.   Eyes:      Conjunctiva/sclera: Conjunctivae normal.   Neck:      Thyroid: No thyromegaly.      Trachea: No tracheal deviation.   Cardiovascular:      Rate and Rhythm: Normal rate and regular rhythm.      Pulses: Normal pulses.      Heart sounds: Normal heart sounds.   Pulmonary:      Effort: Pulmonary effort is normal.      Breath sounds: Normal breath sounds.   Musculoskeletal:      Cervical back: Neck supple.   Lymphadenopathy:      Cervical: No cervical adenopathy.   Skin:     General: Skin is warm and dry.   Neurological:      Mental Status: She is alert.   Psychiatric:         Behavior: Behavior normal.        Result Review :                     Assessment and Plan     Diagnoses and all orders for this visit:    1. Controlled type 2 diabetes mellitus without complication, without long-term current use of insulin (Primary)  -     Comprehensive metabolic panel  -     Hemoglobin A1c    2. Iron deficiency anemia, unspecified iron deficiency anemia type  -     CBC & Differential  -     Iron and TIBC    3. Statin intolerance    4. Familial hypercholesterolemia    5. Mixed hyperlipidemia    6. Polyarthralgia    7. Primary hypertension    8. Anxiety and depression    9. Need for hepatitis C screening test  -     Cancel: Hepatitis panel, acute  -     Hepatitis Panel, Acute    10. Upper respiratory tract infection, unspecified type  -     benzonatate (Tessalon Perles) 100 MG capsule; Take 1 capsule by mouth 3 (Three) Times a Day As Needed for Cough.  Dispense: 30 capsule; Refill: 0  -     albuterol sulfate  (90 Base) MCG/ACT inhaler; Inhale 2 puffs Daily.  Dispense: 18 g; Refill: 2  -     azithromycin (ZITHROMAX)  250 MG tablet; Take 2 tablets the first day, then 1 tablet daily for 4 days.  Dispense: 6 tablet; Refill: 0    11. Need for Tdap vaccination  -     Tdap Vaccine => 8yo IM (BOOSTRIX)    12. Rhinosinusitis  -     azithromycin (ZITHROMAX) 250 MG tablet; Take 2 tablets the first day, then 1 tablet daily for 4 days.  Dispense: 6 tablet; Refill: 0    Other orders  -     Interpretation:        Plan:  Labs today   Azithromycin course for uri/sinusitis  Tessalon perles for cough  Albuterol inhaler as needed for wheezing  Tdap today   Continue all other current treatment   Continue diabetes management            Follow Up     Return in about 6 months (around 7/24/2024) for Recheck.  Patient was given instructions and counseling regarding her condition or for health maintenance advice. Please see specific information pulled into the AVS if appropriate.         Answers submitted by the patient for this visit:  Other (Submitted on 1/18/2024)  Please describe your symptoms.: 6 month checkup  Have you had these symptoms before?: No  How long have you been having these symptoms?: Greater than 2 weeks  Primary Reason for Visit (Submitted on 1/18/2024)  What is the primary reason for your visit?: Other

## 2024-01-25 LAB
ALBUMIN SERPL-MCNC: 4.7 G/DL (ref 3.5–5.2)
ALBUMIN/GLOB SERPL: 1.7 G/DL
ALP SERPL-CCNC: 58 U/L (ref 39–117)
ALT SERPL-CCNC: 19 U/L (ref 1–33)
AST SERPL-CCNC: 17 U/L (ref 1–32)
BASOPHILS # BLD AUTO: 0.04 10*3/MM3 (ref 0–0.2)
BASOPHILS NFR BLD AUTO: 0.7 % (ref 0–1.5)
BILIRUB SERPL-MCNC: 0.2 MG/DL (ref 0–1.2)
BUN SERPL-MCNC: 16 MG/DL (ref 6–20)
BUN/CREAT SERPL: 25.4 (ref 7–25)
CALCIUM SERPL-MCNC: 9.3 MG/DL (ref 8.6–10.5)
CHLORIDE SERPL-SCNC: 102 MMOL/L (ref 98–107)
CO2 SERPL-SCNC: 25.8 MMOL/L (ref 22–29)
CREAT SERPL-MCNC: 0.63 MG/DL (ref 0.57–1)
EGFRCR SERPLBLD CKD-EPI 2021: 111.6 ML/MIN/1.73
EOSINOPHIL # BLD AUTO: 0.05 10*3/MM3 (ref 0–0.4)
EOSINOPHIL NFR BLD AUTO: 0.9 % (ref 0.3–6.2)
ERYTHROCYTE [DISTWIDTH] IN BLOOD BY AUTOMATED COUNT: 13 % (ref 12.3–15.4)
GLOBULIN SER CALC-MCNC: 2.7 GM/DL
GLUCOSE SERPL-MCNC: 84 MG/DL (ref 65–99)
HAV IGM SERPL QL IA: NEGATIVE
HBA1C MFR BLD: 6.5 % (ref 4.8–5.6)
HBV CORE IGM SERPL QL IA: NEGATIVE
HBV SURFACE AG SERPL QL IA: NEGATIVE
HCT VFR BLD AUTO: 39 % (ref 34–46.6)
HCV AB SERPL QL IA: NORMAL
HCV IGG SERPL QL IA: NON REACTIVE
HGB BLD-MCNC: 12.4 G/DL (ref 12–15.9)
IMM GRANULOCYTES # BLD AUTO: 0.01 10*3/MM3 (ref 0–0.05)
IMM GRANULOCYTES NFR BLD AUTO: 0.2 % (ref 0–0.5)
IRON SATN MFR SERPL: 28 % (ref 20–50)
IRON SERPL-MCNC: 145 MCG/DL (ref 37–145)
LYMPHOCYTES # BLD AUTO: 2.08 10*3/MM3 (ref 0.7–3.1)
LYMPHOCYTES NFR BLD AUTO: 37.4 % (ref 19.6–45.3)
MCH RBC QN AUTO: 26.3 PG (ref 26.6–33)
MCHC RBC AUTO-ENTMCNC: 31.8 G/DL (ref 31.5–35.7)
MCV RBC AUTO: 82.6 FL (ref 79–97)
MONOCYTES # BLD AUTO: 0.48 10*3/MM3 (ref 0.1–0.9)
MONOCYTES NFR BLD AUTO: 8.6 % (ref 5–12)
NEUTROPHILS # BLD AUTO: 2.9 10*3/MM3 (ref 1.7–7)
NEUTROPHILS NFR BLD AUTO: 52.2 % (ref 42.7–76)
NRBC BLD AUTO-RTO: 0 /100 WBC (ref 0–0.2)
PLATELET # BLD AUTO: 345 10*3/MM3 (ref 140–450)
POTASSIUM SERPL-SCNC: 4.3 MMOL/L (ref 3.5–5.2)
PROT SERPL-MCNC: 7.4 G/DL (ref 6–8.5)
RBC # BLD AUTO: 4.72 10*6/MM3 (ref 3.77–5.28)
SODIUM SERPL-SCNC: 140 MMOL/L (ref 136–145)
TIBC SERPL-MCNC: 524 MCG/DL
UIBC SERPL-MCNC: 379 MCG/DL (ref 112–346)
WBC # BLD AUTO: 5.56 10*3/MM3 (ref 3.4–10.8)

## 2024-02-12 ENCOUNTER — TELEPHONE (OUTPATIENT)
Dept: PAIN MANAGEMENT | Age: 44
End: 2024-02-12

## 2024-02-12 DIAGNOSIS — M54.12 CERVICAL RADICULOPATHY: Primary | ICD-10-CM

## 2024-02-12 DIAGNOSIS — F11.90 CHRONIC, CONTINUOUS USE OF OPIOIDS: ICD-10-CM

## 2024-02-12 DIAGNOSIS — M50.30 DEGENERATIVE DISC DISEASE, CERVICAL: ICD-10-CM

## 2024-02-12 DIAGNOSIS — M54.2 CERVICALGIA: ICD-10-CM

## 2024-02-12 DIAGNOSIS — M50.90 CERVICAL DISC DISORDER: ICD-10-CM

## 2024-02-12 RX ORDER — GABAPENTIN 600 MG/1
600 TABLET ORAL 3 TIMES DAILY
Qty: 90 TABLET | Refills: 2 | Status: SHIPPED | OUTPATIENT
Start: 2024-02-12 | End: 2024-02-13

## 2024-02-12 NOTE — TELEPHONE ENCOUNTER
Call received from [facility] pharmacy regarding script for gabapentin.  Script reads gabapentin 600 mg 1 tablet po TID, patient takes 2 tablets TID.  Pharmacist is calling to clarify if patient does take two tablets tid, and if so, quantity on script will have to be corrected and new script sent.

## 2024-02-13 DIAGNOSIS — M54.2 CERVICALGIA: ICD-10-CM

## 2024-02-13 DIAGNOSIS — M50.90 CERVICAL DISC DISORDER: ICD-10-CM

## 2024-02-13 DIAGNOSIS — M50.30 DEGENERATIVE DISC DISEASE, CERVICAL: ICD-10-CM

## 2024-02-13 DIAGNOSIS — M54.12 CERVICAL RADICULOPATHY: ICD-10-CM

## 2024-02-13 DIAGNOSIS — F11.90 CHRONIC, CONTINUOUS USE OF OPIOIDS: ICD-10-CM

## 2024-02-13 RX ORDER — GABAPENTIN 600 MG/1
TABLET ORAL
Qty: 180 TABLET | Refills: 0 | Status: SHIPPED | OUTPATIENT
Start: 2024-02-13 | End: 2024-03-14

## 2024-02-14 ENCOUNTER — HOSPITAL ENCOUNTER (OUTPATIENT)
Dept: PAIN MANAGEMENT | Age: 44
Discharge: HOME | End: 2024-02-14
Payer: COMMERCIAL

## 2024-02-14 VITALS
SYSTOLIC BLOOD PRESSURE: 123 MMHG | OXYGEN SATURATION: 100 % | RESPIRATION RATE: 16 BRPM | DIASTOLIC BLOOD PRESSURE: 88 MMHG | TEMPERATURE: 97.3 F | HEART RATE: 79 BPM

## 2024-02-14 PROCEDURE — 6360000002 HC RX W HCPCS

## 2024-02-14 PROCEDURE — 2500000003 HC RX 250 WO HCPCS

## 2024-02-14 PROCEDURE — 20553 NJX 1/MLT TRIGGER POINTS 3/>: CPT

## 2024-02-14 PROCEDURE — 20553 NJX 1/MLT TRIGGER POINTS 3/>: CPT | Performed by: NURSE PRACTITIONER

## 2024-02-14 RX ORDER — LIDOCAINE HYDROCHLORIDE 10 MG/ML
10 INJECTION, SOLUTION EPIDURAL; INFILTRATION; INTRACAUDAL; PERINEURAL ONCE
Status: DISCONTINUED | OUTPATIENT
Start: 2024-02-14 | End: 2024-02-16 | Stop reason: HOSPADM

## 2024-02-14 RX ORDER — BUPIVACAINE HYDROCHLORIDE 5 MG/ML
10 INJECTION, SOLUTION EPIDURAL; INTRACAUDAL ONCE
Status: DISCONTINUED | OUTPATIENT
Start: 2024-02-14 | End: 2024-02-16 | Stop reason: HOSPADM

## 2024-02-14 NOTE — DISCHARGE INSTRUCTIONS
[facility]  Post Procedure Discharge Instructions        YOU HAVE HAD THE FOLLOWING PROCEDURE:                                  [] Occipital Nerve Blocks  [] CTS wrist injection(s)  [] Knee Injection(s)         [] Shoulder Injection(s)   [] Elbow Injection(s)     [] Botox Injection  [x] Cervical Trigger Point Injections    [] Thoracic Trigger Point Injections    [] Lumbar Trigger Point Injections  [] Piriformis Trigger Point Injections  [] SI Joint Injection(s)     [] Trochanteric Bursa Injection(s)       [] Ankle Injection(s)   [] Plantar Fasciitis   []  ______________  Injection(s) [] Botox []  Migraines [] Spasticity    YOU HAVE RECEIVED THE FOLLOWING MEDICATIONS IN YOUR INJECTION(s)  [x] Lidocaine [x] Bupivacaine   [] DepoMedrol (steroid) [] Decadron (steroid)  []  Kenalog (steroid)   [] Toradol  [] Supartz [] Zilretta    [] Botox        PATIENT INFORMATION:   You may experience the following symptoms after your procedure. These symptoms are normal and should not cause concern:    You may have an increase in your pain. This may last 24 - 48 hours after your procedure.  You may have no change in the pain that you had prior to your injection(s).  You may have weakness or numbness in your affected extremity. If this occurs, this may last until numbing the medication wears off.     REPORT THE FOLLOWING SYMPTOMS TO YOUR DOCTOR:  Redness, swelling or drainage at the injection site(s)  Unusual pain that interferes with your normal activities of daily living.    OTHER INSTRUCTIONS:    [] I will apply ice to the injection site(s) for at least 24 hours after the procedure. I will rotate the ice on for 20 minutes and off for 20 minutes for at least 24 hours.    [] I will not apply heat for at least 48 hours and I will not take a hot bath or shower for at least 24 hours.     [] I understand that if Lidocaine or Bupivacaine was used in my injection(s) that the injection site(s) will

## 2024-02-15 NOTE — PROCEDURES
[facility]    Patient Name: Gali Colin    : 1980                    Age: 43 y.o.    Sex: female    Date: 2024    Pre-op Diagnosis: Myofascial Pain/ Muscle Spasms/ Cervicalgia    Post-op Diagnosis: Myofascial Pain/ Muscle Spasms/ Cervicalgia    Procedure: Cervical Trigger Point Injections    Performing Procedure: MICHI Chamberlain - BC, VA-BC    Patient Vitals for the past 24 hrs:   BP Temp Temp src Pulse Resp SpO2   24 1136 123/88 97.3 °F (36.3 °C) Temporal 79 16 100 %       Description of Procedure:    After a brief physical assessment and failure to improve with conservative measures the patient presented for Cervical Trigger Point Injections The indications, limitations and possible complications were discussed with the patient and the patient elected to proceed with the procedure.    After voluntary, informed and signed consent obtained the patient was placed in a seated position.     Appropriate time out was obtained per policy.     The area of maximal tenderness was palpated over the   bilaterally Cervical muscles - Splenius  Trapezius  Rhomboid The skin overlying these areas was marked with a skin marker. The skin overlying the proposed injection sites were then sprayed with Gebauer's Solution while protecting patient eyes. The areas were prepped using aseptic technique with CHG prep. Each trigger point of the Cervical muscles - Splenius  Trapezius  Rhomboid was injected with approximately 2 ml of a solution of 5 ml of 1% Lidocaine Plain and 5 ml of 0.5% Marcaine Plain    Discharge:  The patient tolerated the procedure well. There were no complications during the procedure and the patient was discharged home with discharge instructions.    The patient has been instructed to contact the office should there be any complications or questions to arise between today and their next appointment.    Plan:    Will return to the office in 6

## 2024-02-21 ENCOUNTER — HOSPITAL ENCOUNTER (OUTPATIENT)
Dept: PAIN MANAGEMENT | Age: 44
Discharge: HOME | End: 2024-02-21
Payer: COMMERCIAL

## 2024-02-21 VITALS
SYSTOLIC BLOOD PRESSURE: 124 MMHG | TEMPERATURE: 96.7 F | RESPIRATION RATE: 18 BRPM | OXYGEN SATURATION: 96 % | HEART RATE: 104 BPM | DIASTOLIC BLOOD PRESSURE: 84 MMHG

## 2024-02-21 PROCEDURE — 6360000002 HC RX W HCPCS

## 2024-02-21 PROCEDURE — 20553 NJX 1/MLT TRIGGER POINTS 3/>: CPT

## 2024-02-21 PROCEDURE — 20553 NJX 1/MLT TRIGGER POINTS 3/>: CPT | Performed by: NURSE PRACTITIONER

## 2024-02-21 PROCEDURE — 2500000003 HC RX 250 WO HCPCS

## 2024-02-21 RX ORDER — LIDOCAINE HYDROCHLORIDE 10 MG/ML
10 INJECTION, SOLUTION EPIDURAL; INFILTRATION; INTRACAUDAL; PERINEURAL ONCE
Status: DISCONTINUED | OUTPATIENT
Start: 2024-02-21 | End: 2024-02-23 | Stop reason: HOSPADM

## 2024-02-21 RX ORDER — BUPIVACAINE HYDROCHLORIDE 5 MG/ML
10 INJECTION, SOLUTION EPIDURAL; INTRACAUDAL ONCE
Status: DISCONTINUED | OUTPATIENT
Start: 2024-02-21 | End: 2024-02-23 | Stop reason: HOSPADM

## 2024-02-21 NOTE — DISCHARGE INSTRUCTIONS
[facility]  Post Procedure Discharge Instructions        YOU HAVE HAD THE FOLLOWING PROCEDURE:                                  [] Occipital Nerve Blocks  [] CTS wrist injection(s)  [] Knee Injection(s)         [] Shoulder Injection(s)   [] Elbow Injection(s)     [] Botox Injection  [x] Cervical Trigger Point Injections    [] Thoracic Trigger Point Injections    [] Lumbar Trigger Point Injections  [] Piriformis Trigger Point Injections  [] SI Joint Injection(s)     [] Trochanteric Bursa Injection(s)       [] Ankle Injection(s)   [] Plantar Fasciitis   []  ______________  Injection(s) [] Botox []  Migraines [] Spasticity    YOU HAVE RECEIVED THE FOLLOWING MEDICATIONS IN YOUR INJECTION(s)  [x] Lidocaine [x] Bupivacaine   [] DepoMedrol (steroid) [] Decadron (steroid)  []  Kenalog (steroid)   [] Toradol  [] Supartz [] Zilretta    [] Botox        PATIENT INFORMATION:   You may experience the following symptoms after your procedure. These symptoms are normal and should not cause concern:    You may have an increase in your pain. This may last 24 - 48 hours after your procedure.  You may have no change in the pain that you had prior to your injection(s).  You may have weakness or numbness in your affected extremity. If this occurs, this may last until numbing the medication wears off.     REPORT THE FOLLOWING SYMPTOMS TO YOUR DOCTOR:  Redness, swelling or drainage at the injection site(s)  Unusual pain that interferes with your normal activities of daily living.    OTHER INSTRUCTIONS:    [x] I will apply ice to the injection site(s) for at least 24 hours after the procedure. I will rotate the ice on for 20 minutes and off for 20 minutes for at least 24 hours.    [x] I will not apply heat for at least 48 hours and I will not take a hot bath or shower for at least 24 hours.     [x] I understand that if Lidocaine or Bupivacaine was used in my injection(s) that the injection site(s)

## 2024-02-21 NOTE — PROGRESS NOTES
Procedure:  Level of Consciousness: [x]Alert [x]Oriented []Disoriented []Lethargic  Anxiety Level: [x]Calm []Anxious []Depressed []Other  Skin: []Warm [x]Dry []Cool []Moist []Intact []Other  Cardiovascular: [x]Palpitations: [x]Never []Occasionally []Frequently  Chest Pain: [x]No []Yes  Respiratory:  [x]Unlabored []Labored []Cough ([] Productive []Unproductive)  HCG Required: [x]No []Yes   Results: []Negative []Positive  Knowledge Level:        [x]Patient/Other verbalized understanding of pre-procedure instructions.        [x]Assessment of post-op care needs (transportation, responsible caregiver)        [x]Able to discuss health care problems and how to deal with it.  Factors that Affect Teaching:        Language Barrier: [x]No []Yes - why:        Hearing Loss:        [x]No []Yes            Corrective Device:  []Yes []No        Vision Loss:           [x]No []Yes            Corrective Device:  []Yes []No        Memory Loss:       [x]No []Yes            []Short Term []Long Term  Motivational Level:  [x]Asks Questions                  []Extremely Anxious       [x]Seems Interested               []Seems Uninterested                  []Denies need for Education  Risk for Injury:  [x]Patient oriented to person, place and time  []History of frequent falls/loss of balance  Nutritional:  []Change in appetite   []Weight Gain   []Weight Loss  Functional:  []Requires assistance with ADL'sProcedure:  Level of Consciousness: []Alert []Oriented []Disoriented []Lethargic  Anxiety Level: []Calm []Anxious []Depressed []Other  Skin: []Warm []Dry []Cool []Moist []Intact []Other  Cardiovascular: []Palpitations: []Never []Occasionally []Frequently  Chest Pain: []No []Yes  Respiratory:  []Unlabored []Labored []Cough ([] Productive []Unproductive)  HCG Required: []No []Yes   Results: []Negative []Positive  Knowledge Level:        []Patient/Other verbalized understanding of pre-procedure instructions.        []Assessment of post-op care

## 2024-02-22 NOTE — PROCEDURES
[facility]    Patient Name: Gali Colin    : 1980                    Age: 43 y.o.    Sex: female    Date: 2024    Pre-op Diagnosis: Myofascial Pain/ Muscle Spasms/ Cervicalgia    Post-op Diagnosis: Myofascial Pain/ Muscle Spasms/ Cervicalgia    Procedure: Cervical Trigger Point Injections    Performing Procedure: MICHI Chamberlain - BC, [facility]-BC    Patient Vitals for the past 24 hrs:   BP Temp Temp src Pulse Resp SpO2   24 1354 124/84 (!) 96.7 °F (35.9 °C) Temporal (!) 104 18 96 %       Description of Procedure:    After a brief physical assessment and failure to improve with conservative measures the patient presented for Cervical Trigger Point Injections The indications, limitations and possible complications were discussed with the patient and the patient elected to proceed with the procedure.    After voluntary, informed and signed consent obtained the patient was placed in a seated position.     Appropriate time out was obtained per policy.     The area of maximal tenderness was palpated over the   bilaterally Cervical muscles - Splenius  Trapezius  Rhomboid The skin overlying these areas was marked with a skin marker. The skin overlying the proposed injection sites were then sprayed with Gebauer's Solution while protecting patient eyes. The areas were prepped using aseptic technique with CHG prep. Each trigger point of the Cervical muscles - Splenius  Trapezius  Rhomboid was injected with approximately 2 ml of a solution of 5 ml of 1% Lidocaine Plain and 5 ml of 0.5% Marcaine Plain    Discharge:  The patient tolerated the procedure well. There were no complications during the procedure and the patient was discharged home with discharge instructions.    The patient has been instructed to contact the office should there be any complications or questions to arise between today and their next appointment.    Plan:    Will return to the office

## 2024-02-28 ENCOUNTER — HOSPITAL ENCOUNTER (OUTPATIENT)
Dept: PAIN MANAGEMENT | Age: 44
Discharge: HOME | End: 2024-02-28
Payer: COMMERCIAL

## 2024-02-28 VITALS
DIASTOLIC BLOOD PRESSURE: 96 MMHG | HEART RATE: 98 BPM | OXYGEN SATURATION: 100 % | SYSTOLIC BLOOD PRESSURE: 140 MMHG | TEMPERATURE: 97.3 F | RESPIRATION RATE: 16 BRPM

## 2024-02-28 PROCEDURE — 6360000002 HC RX W HCPCS

## 2024-02-28 PROCEDURE — 20553 NJX 1/MLT TRIGGER POINTS 3/>: CPT | Performed by: NURSE PRACTITIONER

## 2024-02-28 PROCEDURE — 2500000003 HC RX 250 WO HCPCS

## 2024-02-28 PROCEDURE — 20553 NJX 1/MLT TRIGGER POINTS 3/>: CPT

## 2024-02-28 RX ORDER — BUPIVACAINE HYDROCHLORIDE 5 MG/ML
10 INJECTION, SOLUTION EPIDURAL; INTRACAUDAL ONCE
Status: DISCONTINUED | OUTPATIENT
Start: 2024-02-28 | End: 2024-03-01 | Stop reason: HOSPADM

## 2024-02-28 RX ORDER — LIDOCAINE HYDROCHLORIDE 10 MG/ML
10 INJECTION, SOLUTION EPIDURAL; INFILTRATION; INTRACAUDAL; PERINEURAL ONCE
Status: DISCONTINUED | OUTPATIENT
Start: 2024-02-28 | End: 2024-03-01 | Stop reason: HOSPADM

## 2024-02-28 NOTE — PROCEDURES
[facility]    Patient Name: Gali Colin    : 1980                    Age: 43 y.o.    Sex: female    Date: 2024    Pre-op Diagnosis: Myofascial Pain/ Muscle Spasms/ Cervicalgia    Post-op Diagnosis: Myofascial Pain/ Muscle Spasms/ Cervicalgia    Procedure: Cervical Trigger Point Injections    Performing Procedure: MICHI Chamberlain - BC, VA-BC    Patient Vitals for the past 24 hrs:   BP Temp Temp src Pulse Resp SpO2   24 1042 (!) 140/96 97.3 °F (36.3 °C) Temporal 98 16 100 %       Description of Procedure:    After a brief physical assessment and failure to improve with conservative measures the patient presented for Cervical Trigger Point Injections The indications, limitations and possible complications were discussed with the patient and the patient elected to proceed with the procedure.    After voluntary, informed and signed consent obtained the patient was placed in a seated position.     Appropriate time out was obtained per policy.     The area of maximal tenderness was palpated over the   bilaterally Cervical muscles - Splenius  Trapezius  Rhomboid The skin overlying these areas was marked with a skin marker. The skin overlying the proposed injection sites were then sprayed with Gebauer's Solution while protecting patient eyes. The areas were prepped using aseptic technique with CHG prep. Each trigger point of the Cervical muscles - Splenius  Trapezius  Rhomboid was injected with approximately 2 ml of a solution of 5 ml of 1% Lidocaine Plain and 5 ml of 0.5% Marcaine Plain    Discharge:  The patient tolerated the procedure well. There were no complications during the procedure and the patient was discharged home with discharge instructions.    The patient has been instructed to contact the office should there be any complications or questions to arise between today and their next appointment.    Plan:    Will return to the office in 6

## 2024-02-28 NOTE — DISCHARGE INSTRUCTIONS
will be numb for a few hours after the procedure    [] I understand if a steroid was used it will take effect in 3 - 7 days. I understand that as the numbing medication wears off, the pain may return until the steroids start working.    [x] I understand that today I will rest for the next 24 hours and drink plenty of water.    [] For Botox for Migraines please do not wear anything constricting around the forehead for 7-10 days post injection. Examples headband, hats, or bandana    [] For Botox for Spasticity start therapy for the affected limb in two weeks.    [] Additional instructions: ______________________________________________ ___________________________________________________________________    Sedation:  Was oral sedation given?    [] Yes  [x] No    I understand that if I took an oral nerve calming medication I will not drive for  [] 24 hours after taking the medication.    [x] I have received a copy of my discharge instructions and understand the above instructions to the best of my knowledge    Patient Discharged:  [x] Home  [] Hospital  [] Other  ____________________________________________    Via:  [x] Ambulatory  [] Wheelchair   [] Stretcher [] EMS       Accompanied By:   [] Significant other  [x] Family Member  [] Friend   [] Hospital Staff  []  Ambulance Staff  [] Other_______________________________________________    Plan:  [] Will return to the office in   [] 1 month   [] 3 months for:  [] Procedure Follow-up  [] Office Visit   [] Planned Procedure      Patient Signature: _____________________________________________________    Staff Signature: _______________________________________________________        If you have questions, problems or concerns you may call [phone] and follow the prompts    MICHI Chamberlain, VA-BC

## 2024-03-07 ENCOUNTER — TELEPHONE (OUTPATIENT)
Dept: PAIN MANAGEMENT | Age: 44
End: 2024-03-07

## 2024-03-20 ENCOUNTER — HOSPITAL ENCOUNTER (OUTPATIENT)
Dept: PAIN MANAGEMENT | Age: 44
Discharge: HOME | End: 2024-03-20
Payer: COMMERCIAL

## 2024-03-20 VITALS
OXYGEN SATURATION: 99 % | RESPIRATION RATE: 16 BRPM | HEART RATE: 73 BPM | WEIGHT: 125 LBS | BODY MASS INDEX: 26.97 KG/M2 | SYSTOLIC BLOOD PRESSURE: 127 MMHG | DIASTOLIC BLOOD PRESSURE: 83 MMHG | HEIGHT: 57 IN | TEMPERATURE: 97.8 F

## 2024-03-20 DIAGNOSIS — M54.2 CERVICALGIA: ICD-10-CM

## 2024-03-20 DIAGNOSIS — F11.90 CHRONIC, CONTINUOUS USE OF OPIOIDS: ICD-10-CM

## 2024-03-20 DIAGNOSIS — M50.90 CERVICAL DISC DISORDER: ICD-10-CM

## 2024-03-20 DIAGNOSIS — M50.30 DEGENERATIVE DISC DISEASE, CERVICAL: ICD-10-CM

## 2024-03-20 DIAGNOSIS — M54.12 CERVICAL RADICULOPATHY: ICD-10-CM

## 2024-03-20 DIAGNOSIS — G62.9 PERIPHERAL POLYNEUROPATHY: Primary | ICD-10-CM

## 2024-03-20 PROCEDURE — 99213 OFFICE O/P EST LOW 20 MIN: CPT

## 2024-03-20 RX ORDER — CELECOXIB 100 MG/1
100 CAPSULE ORAL 2 TIMES DAILY
Qty: 60 CAPSULE | Refills: 2 | Status: SHIPPED | OUTPATIENT
Start: 2024-03-20

## 2024-03-20 RX ORDER — TIZANIDINE 4 MG/1
TABLET ORAL
Qty: 60 TABLET | Refills: 2 | Status: SHIPPED | OUTPATIENT
Start: 2024-03-20

## 2024-03-20 RX ORDER — GABAPENTIN 600 MG/1
TABLET ORAL
Qty: 180 TABLET | Refills: 0 | Status: CANCELLED | OUTPATIENT
Start: 2024-03-20 | End: 2024-04-19

## 2024-03-20 ASSESSMENT — PAIN DESCRIPTION - ORIENTATION: ORIENTATION: LOWER

## 2024-03-20 ASSESSMENT — ENCOUNTER SYMPTOMS
CONSTIPATION: 0
BACK PAIN: 0

## 2024-03-20 ASSESSMENT — PAIN DESCRIPTION - PAIN TYPE: TYPE: CHRONIC PAIN

## 2024-03-20 ASSESSMENT — PAIN SCALES - GENERAL: PAINLEVEL_OUTOF10: 2

## 2024-03-20 ASSESSMENT — PAIN DESCRIPTION - LOCATION: LOCATION: NECK

## 2024-03-20 NOTE — PROGRESS NOTES
Clinic Documentation      Education Provided:  [x] Review of Manan  [] Agreement Review  [x] PEG Score Calculated [] PHQ Score Calculated [] ORT Score Calculated    [] Compliance Issues Discussed [] Cognitive Behavior Needs [x] Exercise [] Review of Test [] Financial Issues  [x] Tobacco/Alcohol Use Reviewed [x] Teaching [] New Patient [] Picture Obtained    Physician Plan:  [] Outgoing Referral  [] Pharmacy Consult  [] Test Ordered [x] Prescription Ordered/Changed   [] Obtained Test Results / Consult Notes        Complete if patient is withholding blood thinner for procedure     Blood Thinner Patient is currently taking:      [] Plavix (Hold for 7 days)  [] Aspirin (Hold for 5 days)     [] Pletal (Hold for 2 days)  [] Pradaxa (Hold for 3 days)    [] Effient (Hold for 7 days)  [] Xarelto (Hold for 2 days)    [] Eliquis (Hold for 2 days)  [] Brilinta (Hold for 7 days)    [] Coumadin (Hold for 5 days) - (INR needs to be drawn the day prior to procedure- INR < 2.0)    [] Aggrenox (Hold for 7 days)        [] Patient will stop medication on their own.    [] Blood Thinner Form Faxed for approval to hold.   Provider form faxed to:     Assessment Completed by:  Electronically signed by Jessi Sanchez MA on 3/20/2024 at 3:09 PM

## 2024-03-20 NOTE — TELEPHONE ENCOUNTER
1. Cervical radiculopathy    2. Chronic, continuous use of opioids    3. Cervicalgia    4. Cervical disc disorder    5. Degenerative disc disease, cervical      Requested Prescriptions     Pending Prescriptions Disp Refills    gabapentin (NEURONTIN) 600 MG tablet 180 tablet 0     Sig: Take 2 tablets by mouth three times a day       Continue medication with refill sent at appointment yes; refill sent to medical director at appointment yes, see refill encounter dated 3/20/2024    Electronically signed by Beatris Monzon PA-C on 3/20/2024 at 3:38 PM

## 2024-03-20 NOTE — TELEPHONE ENCOUNTER
1. Cervicalgia    2. Cervical radiculopathy    3. Chronic, continuous use of opioids    4. Cervical disc disorder    5. Degenerative disc disease, cervical      Requested Prescriptions     Pending Prescriptions Disp Refills    celecoxib (CELEBREX) 100 MG capsule 60 capsule 2     Sig: Take 1 capsule by mouth 2 times daily    tiZANidine (ZANAFLEX) 4 MG tablet 60 tablet 2     Si/4 tablet with meals 1/2 to whole tablet at bedtime       Continue medication with refill sent at appointment yes; refill sent to medical director at appointment no, see refill encounter dated 3/20/2024    Electronically signed by Beatris Monzon PA-C on 3/20/2024 at 3:36 PM
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
## 2024-03-22 RX ORDER — GABAPENTIN 600 MG/1
TABLET ORAL
Qty: 180 TABLET | Refills: 0 | Status: SHIPPED | OUTPATIENT
Start: 2024-03-22 | End: 2024-04-19

## 2024-04-01 RX ORDER — CITALOPRAM HYDROBROMIDE 10 MG/1
20 TABLET ORAL DAILY
Qty: 180 TABLET | Refills: 0 | Status: SHIPPED | OUTPATIENT
Start: 2024-04-01

## 2024-04-01 RX ORDER — LISINOPRIL 10 MG/1
10 TABLET ORAL DAILY
Qty: 30 TABLET | Refills: 11 | Status: SHIPPED | OUTPATIENT
Start: 2024-04-01

## 2024-04-01 NOTE — TELEPHONE ENCOUNTER
Patient reports she is moving and needs refill until she can find new doctor.     Last RF: 1/18/2024     Last OV: 1/24/2024

## (undated) DEVICE — ENDOGATOR AUXILIARY WATER JET CONNECTOR: Brand: ENDOGATOR

## (undated) DEVICE — SENSR O2 OXIMAX FNGR A/ 18IN NONSTR

## (undated) DEVICE — FRCP BX RADJAW4 NDL 2.8 240 STD OG

## (undated) DEVICE — Device: Brand: DEFENDO AIR/WATER/SUCTION AND BIOPSY VALVE

## (undated) DEVICE — YANKAUER,BULB TIP WITH VENT: Brand: ARGYLE

## (undated) DEVICE — CONMED SCOPE SAVER BITE BLOCK, 20X27 MM: Brand: SCOPE SAVER

## (undated) DEVICE — CUFF,BP,DISP,1 TUBE,ADULT,HP: Brand: MEDLINE

## (undated) DEVICE — THE CHANNEL CLEANING BRUSH IS A NYLON FLEXI BRUSH ATTACHED TO A FLEXIBLE PLASTIC SHEATH DESIGNED TO SAFELY REMOVE DEBRIS FROM FLEXIBLE ENDOSCOPES.

## (undated) DEVICE — MASK,OXYGEN,MED CONC,ADLT,7' TUB, UC: Brand: PENDING

## (undated) DEVICE — TBG SMPL FLTR LINE NASL 02/C02 A/ BX/100